# Patient Record
Sex: FEMALE | Race: BLACK OR AFRICAN AMERICAN | NOT HISPANIC OR LATINO | Employment: UNEMPLOYED
[De-identification: names, ages, dates, MRNs, and addresses within clinical notes are randomized per-mention and may not be internally consistent; named-entity substitution may affect disease eponyms.]

---

## 2020-01-10 ENCOUNTER — HOSPITAL (OUTPATIENT)
Dept: OTHER | Age: 40
End: 2020-01-10
Attending: FAMILY MEDICINE

## 2020-03-06 ENCOUNTER — HOSPITAL (OUTPATIENT)
Dept: OTHER | Age: 40
End: 2020-03-06
Attending: FAMILY MEDICINE

## 2020-12-10 ENCOUNTER — HOSPITAL ENCOUNTER (EMERGENCY)
Age: 40
Discharge: HOME | End: 2020-12-11
Attending: EMERGENCY MEDICINE

## 2020-12-10 DIAGNOSIS — D64.9 ANEMIA, UNSPECIFIED TYPE: Primary | ICD-10-CM

## 2020-12-10 LAB
ABO + RH BLD: NORMAL
BASOPHILS # BLD: 0.1 K/MCL (ref 0–0.3)
BASOPHILS NFR BLD: 1 %
BLD GP AB SCN SERPL QL GEL: NEGATIVE
BLOOD EXPIRATION DATE: NORMAL
BLOOD EXPIRATION DATE: NORMAL
CROSSMATCH RESULT: NORMAL
CROSSMATCH RESULT: NORMAL
DEPRECATED RDW RBC: 45.2 FL (ref 39–50)
DISPENSE STATUS: NORMAL
DISPENSE STATUS: NORMAL
EOSINOPHIL # BLD: 0.1 K/MCL (ref 0–0.5)
EOSINOPHIL NFR BLD: 2 %
ERYTHROCYTE [DISTWIDTH] IN BLOOD: 19.2 % (ref 11–15)
HCG UR QL: NEGATIVE
HCT VFR BLD CALC: 22.2 % (ref 36–46.5)
HGB BLD-MCNC: 5.4 G/DL (ref 12–15.5)
IMM GRANULOCYTES # BLD AUTO: 0 K/MCL (ref 0–0.2)
IMM GRANULOCYTES # BLD: 1 %
IRON SATN MFR SERPL: 3 % (ref 15–45)
IRON SERPL-MCNC: 16 MCG/DL (ref 50–170)
ISBT BLOOD TYPE: 9500
ISBT BLOOD TYPE: 9500
ISSUE DATE/TIME: NORMAL
ISSUE DATE/TIME: NORMAL
LYMPHOCYTES # BLD: 1.9 K/MCL (ref 1–4.8)
LYMPHOCYTES NFR BLD: 25 %
MCH RBC QN AUTO: 16.3 PG (ref 26–34)
MCHC RBC AUTO-ENTMCNC: 24.3 G/DL (ref 32–36.5)
MCV RBC AUTO: 67.1 FL (ref 78–100)
MONOCYTES # BLD: 0.5 K/MCL (ref 0.3–0.9)
MONOCYTES NFR BLD: 7 %
NEUTROPHILS # BLD: 5 K/MCL (ref 1.8–7.7)
NEUTROPHILS NFR BLD: 64 %
NRBC BLD MANUAL-RTO: 0 /100 WBC
PLATELET # BLD AUTO: 382 K/MCL (ref 140–450)
PRODUCT CODE: NORMAL
PRODUCT CODE: NORMAL
PRODUCT DESCRIPTION: NORMAL
PRODUCT DESCRIPTION: NORMAL
PRODUCT ID: NORMAL
PRODUCT ID: NORMAL
RBC # BLD: 3.31 MIL/MCL (ref 4–5.2)
TIBC SERPL-MCNC: 459 MCG/DL (ref 250–450)
TYPE AND SCREEN EXPIRATION DATE: NORMAL
UNIT BLOOD TYPE: NORMAL
UNIT BLOOD TYPE: NORMAL
UNIT NUMBER: NORMAL
UNIT NUMBER: NORMAL
WBC # BLD: 7.7 K/MCL (ref 4.2–11)

## 2020-12-10 PROCEDURE — 83540 ASSAY OF IRON: CPT | Performed by: STUDENT IN AN ORGANIZED HEALTH CARE EDUCATION/TRAINING PROGRAM

## 2020-12-10 PROCEDURE — P9016 RBC LEUKOCYTES REDUCED: HCPCS

## 2020-12-10 PROCEDURE — 86901 BLOOD TYPING SEROLOGIC RH(D): CPT | Performed by: EMERGENCY MEDICINE

## 2020-12-10 PROCEDURE — 99283 EMERGENCY DEPT VISIT LOW MDM: CPT

## 2020-12-10 PROCEDURE — 99284 EMERGENCY DEPT VISIT MOD MDM: CPT | Performed by: EMERGENCY MEDICINE

## 2020-12-10 PROCEDURE — 84703 CHORIONIC GONADOTROPIN ASSAY: CPT

## 2020-12-10 PROCEDURE — 96366 THER/PROPH/DIAG IV INF ADDON: CPT

## 2020-12-10 PROCEDURE — 10002800 HB RX 250 W HCPCS: Performed by: STUDENT IN AN ORGANIZED HEALTH CARE EDUCATION/TRAINING PROGRAM

## 2020-12-10 PROCEDURE — 85025 COMPLETE CBC W/AUTO DIFF WBC: CPT | Performed by: EMERGENCY MEDICINE

## 2020-12-10 PROCEDURE — 96365 THER/PROPH/DIAG IV INF INIT: CPT

## 2020-12-10 PROCEDURE — 10002807 HB RX 258: Performed by: STUDENT IN AN ORGANIZED HEALTH CARE EDUCATION/TRAINING PROGRAM

## 2020-12-10 PROCEDURE — 36430 TRANSFUSION BLD/BLD COMPNT: CPT

## 2020-12-10 RX ORDER — SODIUM CHLORIDE 9 MG/ML
INJECTION, SOLUTION INTRAVENOUS CONTINUOUS PRN
Status: DISCONTINUED | OUTPATIENT
Start: 2020-12-10 | End: 2020-12-11 | Stop reason: HOSPADM

## 2020-12-10 RX ADMIN — IRON SUCROSE 300 MG: 20 INJECTION, SOLUTION INTRAVENOUS at 23:41

## 2020-12-10 ASSESSMENT — ENCOUNTER SYMPTOMS
DIAPHORESIS: 0
CHILLS: 0
WHEEZING: 0
COUGH: 0
SHORTNESS OF BREATH: 1
ACTIVITY CHANGE: 0
WEAKNESS: 0
UNEXPECTED WEIGHT CHANGE: 0
NUMBNESS: 0
HEADACHES: 0
CHOKING: 0
FATIGUE: 1
DIZZINESS: 1
APPETITE CHANGE: 0
EYES NEGATIVE: 1
CHEST TIGHTNESS: 0
LIGHT-HEADEDNESS: 1
FEVER: 0
GASTROINTESTINAL NEGATIVE: 1
STRIDOR: 0

## 2020-12-10 ASSESSMENT — PAIN SCALES - GENERAL: PAINLEVEL_OUTOF10: 0

## 2020-12-11 VITALS
RESPIRATION RATE: 18 BRPM | HEART RATE: 71 BPM | DIASTOLIC BLOOD PRESSURE: 85 MMHG | TEMPERATURE: 98.4 F | SYSTOLIC BLOOD PRESSURE: 134 MMHG | OXYGEN SATURATION: 98 %

## 2020-12-11 LAB
RAINBOW EXTRA TUBES HOLD SPECIMEN: NORMAL

## 2020-12-11 PROCEDURE — 86923 COMPATIBILITY TEST ELECTRIC: CPT

## 2021-11-30 ENCOUNTER — OFF PREMISE (OUTPATIENT)
Dept: OTHER | Age: 41
End: 2021-11-30

## 2021-11-30 PROCEDURE — 93010 ELECTROCARDIOGRAM REPORT: CPT | Performed by: INTERNAL MEDICINE

## 2022-06-13 ENCOUNTER — TELEPHONE (OUTPATIENT)
Dept: OBGYN CLINIC | Facility: CLINIC | Age: 42
End: 2022-06-13

## 2022-06-13 ENCOUNTER — OFFICE VISIT (OUTPATIENT)
Dept: OBGYN CLINIC | Facility: CLINIC | Age: 42
End: 2022-06-13
Payer: COMMERCIAL

## 2022-06-13 ENCOUNTER — LAB ENCOUNTER (OUTPATIENT)
Dept: LAB | Facility: HOSPITAL | Age: 42
End: 2022-06-13
Attending: OBSTETRICS & GYNECOLOGY
Payer: COMMERCIAL

## 2022-06-13 VITALS — WEIGHT: 222 LBS | SYSTOLIC BLOOD PRESSURE: 112 MMHG | DIASTOLIC BLOOD PRESSURE: 82 MMHG

## 2022-06-13 DIAGNOSIS — D25.1 INTRAMURAL AND SUBMUCOUS LEIOMYOMA OF UTERUS: ICD-10-CM

## 2022-06-13 DIAGNOSIS — D64.9 ANEMIA, UNSPECIFIED TYPE: Primary | ICD-10-CM

## 2022-06-13 DIAGNOSIS — N92.0 MENORRHAGIA WITH REGULAR CYCLE: ICD-10-CM

## 2022-06-13 DIAGNOSIS — D25.0 INTRAMURAL AND SUBMUCOUS LEIOMYOMA OF UTERUS: ICD-10-CM

## 2022-06-13 LAB
BASOPHILS # BLD AUTO: 0.06 X10(3) UL (ref 0–0.2)
BASOPHILS NFR BLD AUTO: 0.8 %
DEPRECATED HBV CORE AB SER IA-ACNC: 17.7 NG/ML
DEPRECATED RDW RBC AUTO: 45.3 FL (ref 35.1–46.3)
EOSINOPHIL # BLD AUTO: 0.11 X10(3) UL (ref 0–0.7)
EOSINOPHIL NFR BLD AUTO: 1.5 %
ERYTHROCYTE [DISTWIDTH] IN BLOOD BY AUTOMATED COUNT: 14.6 % (ref 11–15)
HCT VFR BLD AUTO: 37.4 %
HGB BLD-MCNC: 11 G/DL
IMM GRANULOCYTES # BLD AUTO: 0.01 X10(3) UL (ref 0–1)
IMM GRANULOCYTES NFR BLD: 0.1 %
LYMPHOCYTES # BLD AUTO: 1.86 X10(3) UL (ref 1–4)
LYMPHOCYTES NFR BLD AUTO: 26.1 %
MCH RBC QN AUTO: 25.1 PG (ref 26–34)
MCHC RBC AUTO-ENTMCNC: 29.4 G/DL (ref 31–37)
MCV RBC AUTO: 85.2 FL
MONOCYTES # BLD AUTO: 0.46 X10(3) UL (ref 0.1–1)
MONOCYTES NFR BLD AUTO: 6.5 %
NEUTROPHILS # BLD AUTO: 4.63 X10 (3) UL (ref 1.5–7.7)
NEUTROPHILS # BLD AUTO: 4.63 X10(3) UL (ref 1.5–7.7)
NEUTROPHILS NFR BLD AUTO: 65 %
PLATELET # BLD AUTO: 358 10(3)UL (ref 150–450)
RBC # BLD AUTO: 4.39 X10(6)UL
WBC # BLD AUTO: 7.1 X10(3) UL (ref 4–11)

## 2022-06-13 PROCEDURE — 82728 ASSAY OF FERRITIN: CPT | Performed by: OBSTETRICS & GYNECOLOGY

## 2022-06-13 PROCEDURE — 36415 COLL VENOUS BLD VENIPUNCTURE: CPT | Performed by: OBSTETRICS & GYNECOLOGY

## 2022-06-13 PROCEDURE — 85025 COMPLETE CBC W/AUTO DIFF WBC: CPT | Performed by: OBSTETRICS & GYNECOLOGY

## 2022-06-13 RX ORDER — IBUPROFEN 800 MG/1
800 TABLET ORAL EVERY 6 HOURS PRN
Qty: 30 TABLET | Refills: 1 | Status: SHIPPED | OUTPATIENT
Start: 2022-06-13

## 2022-06-13 RX ORDER — TRANEXAMIC ACID 650 1/1
1300 TABLET ORAL 2 TIMES DAILY
Qty: 30 TABLET | Refills: 4 | Status: SHIPPED | OUTPATIENT
Start: 2022-06-13 | End: 2022-06-18

## 2022-06-13 NOTE — PROGRESS NOTES
Patient presents with: Follow - Up: anemia and menorrhagia        Visit Type:  F/u menorrhagia returns    Date of Procedure:  January 2022    Procedure:  Hyst. D&C myosure and novasure     Indications for Procedure: Menorrhagia with uterine fibroids     Pathology Report:  Benign     Surgery/Post-op Complications: None    Pain:  none    Medications pertaining to Surgery: None    Incision (if applicable):  N/A    Diet: No    Voiding:  No    Bowel movements/Flatus: Yes    Activity: Normal    /82   Wt 222 lb (100.7 kg)   LMP 06/03/2022 (Exact Date)   Wt Readings from Last 6 Encounters:  06/13/22 : 222 lb (100.7 kg)    Annual Physical Never done  Annual Depression Screen Never done  Pap Smear Never done  Mammogram Never done  COVID-19 Vaccine(2 - Booster for Godfrey series) due on 12/27/2021  Influenza Vaccine(Season Ended) due on 10/01/2022  Pneumococcal Vaccine: Birth to 64yrs Aged Out      Review of Systems   General: Present- Feeling well. Cardiovascular: Not Present- Chest Pain, Elevated Blood Pressure, Leg Pain and/or Swelling and Shortness of Breath. Gastrointestinal: Not Present- Nausea and Vomiting. Female Genitourinary: Not Present- Discharge, Dysmenorrhea, Dysuria, Excessive Menstrual Bleeding and Pelvic Pain. Musculoskeletal: Not Present- Leg Cramps and Swelling of Extremities. Neurological: Not Present- Headaches. Psychiatric: Not Present- Anxiety and Depression. All other systems negative       Physical Exam   The physical exam findings are as follows:       General   Mental Status - Alert. General Appearance - Well Developed/Well Nourished/No acute distress/ NC/AT. Note: More than 50% of this visit was spent in counseling or coordinating care for the following reason menorrhagia and anemia  . The total amount of time spent was 20 minutes. Plan to check CBC and ferritin levels today. We will send in a prescription for Lysteda and Ferralet. 1. Anemia, unspecified type    2. Intramural and submucous leiomyoma of uterus    3.  Menorrhagia with regular cycle

## 2022-06-15 ENCOUNTER — PATIENT MESSAGE (OUTPATIENT)
Dept: OBGYN CLINIC | Facility: CLINIC | Age: 42
End: 2022-06-15

## 2022-06-16 ENCOUNTER — TELEPHONE (OUTPATIENT)
Dept: OBGYN CLINIC | Facility: CLINIC | Age: 42
End: 2022-06-16

## 2022-06-16 NOTE — TELEPHONE ENCOUNTER
----- Message from Henrique Almaguer MD sent at 6/16/2022 11:35 AM CDT -----  Could benefit from iron infusion.  Please schedule     Henrique Almaguer MD

## 2022-06-16 NOTE — TELEPHONE ENCOUNTER
----- Message from Sydni Raya MD sent at 6/16/2022 11:35 AM CDT -----  Could benefit from iron infusion.  Please schedule     Sydni Raya MD

## 2022-06-16 NOTE — TELEPHONE ENCOUNTER
----- Message from Melo Garcia MD sent at 6/16/2022 11:35 AM CDT -----  Could benefit from iron infusion.  Please schedule     Melo Garcia MD

## 2022-06-17 PROBLEM — D64.9 ANEMIA, UNSPECIFIED: Status: ACTIVE | Noted: 2022-06-17

## 2022-06-20 NOTE — TELEPHONE ENCOUNTER
Patient identity confirmed by name and date of birth. Notified of results and recommendations as noted by provider. Chart review shows that referral for iron infusion has already been sent to infusion center on 6/17/22. Patient advised to return call to office if not contacted in 1-2 days. Verbalizes understanding and agrees with plan. Cathy Green MD   6/16/2022 11:35 AM CDT         Could benefit from iron infusion.  Please schedule   Duane Roper MD

## 2022-06-20 NOTE — TELEPHONE ENCOUNTER
See alternate encounter ([employer] message of 6/15/22) for additional documentation. Patient notified, infusion center referral sent.

## 2022-06-22 ENCOUNTER — TELEPHONE (OUTPATIENT)
Dept: HEMATOLOGY/ONCOLOGY | Facility: HOSPITAL | Age: 42
End: 2022-06-22

## 2022-06-22 NOTE — TELEPHONE ENCOUNTER
Attempted to reach Crystal to schedule Venofer 300 times 3.  No answer mailbox is full unable to leave a message

## 2022-07-19 ENCOUNTER — OFFICE VISIT (OUTPATIENT)
Dept: HEMATOLOGY/ONCOLOGY | Facility: HOSPITAL | Age: 42
End: 2022-07-19
Attending: OBSTETRICS & GYNECOLOGY
Payer: COMMERCIAL

## 2022-07-19 VITALS
TEMPERATURE: 99 F | HEART RATE: 83 BPM | SYSTOLIC BLOOD PRESSURE: 112 MMHG | RESPIRATION RATE: 16 BRPM | DIASTOLIC BLOOD PRESSURE: 72 MMHG | OXYGEN SATURATION: 100 %

## 2022-07-19 DIAGNOSIS — D64.9 ANEMIA, UNSPECIFIED: Primary | ICD-10-CM

## 2022-07-19 PROCEDURE — 96365 THER/PROPH/DIAG IV INF INIT: CPT

## 2022-07-19 PROCEDURE — 96366 THER/PROPH/DIAG IV INF ADDON: CPT

## 2022-07-19 NOTE — PROGRESS NOTES
Pt arrived to infusion for Venofer 300 1 of 3 for anemia. Denies new complaints. Ambulatory with fiance. PIV started in L hand with good blood return noted. Pt states she has had iron in the past and it has worked well. Educated pt on infusion and treatment plan of care-states understanding. Venofer 300mg given over 1.5 hrs with 30 mins observation time. VSS and denies complaints. Tolerated infusion well. PIV dc'd and wrapped in coban. AVS provided.

## 2022-07-25 ENCOUNTER — OFFICE VISIT (OUTPATIENT)
Dept: HEMATOLOGY/ONCOLOGY | Facility: HOSPITAL | Age: 42
End: 2022-07-25
Attending: OBSTETRICS & GYNECOLOGY
Payer: COMMERCIAL

## 2022-07-25 VITALS
OXYGEN SATURATION: 96 % | DIASTOLIC BLOOD PRESSURE: 68 MMHG | RESPIRATION RATE: 16 BRPM | TEMPERATURE: 98 F | SYSTOLIC BLOOD PRESSURE: 119 MMHG | HEART RATE: 80 BPM

## 2022-07-25 DIAGNOSIS — D64.9 ANEMIA: ICD-10-CM

## 2022-07-25 DIAGNOSIS — D64.9 ANEMIA, UNSPECIFIED: Primary | ICD-10-CM

## 2022-07-25 PROCEDURE — 96366 THER/PROPH/DIAG IV INF ADDON: CPT

## 2022-07-25 PROCEDURE — 96365 THER/PROPH/DIAG IV INF INIT: CPT

## 2022-07-25 NOTE — PROGRESS NOTES
Pt arrived to infusion for Venofer 300 2 of 3 for anemia. Arrives ambulating independently accompanied by family member. PIV established to right hand on 2nd attempt - present blood return noted. Venofer 300mg given over 1.5 hrs with 30 mins observation time. VSS and denies complaints. Tolerated infusion well. PIV removed, site covered with gauze and coban. Discharged to home from infusion ambulating independently.

## 2022-08-01 ENCOUNTER — OFFICE VISIT (OUTPATIENT)
Dept: HEMATOLOGY/ONCOLOGY | Facility: HOSPITAL | Age: 42
End: 2022-08-01
Attending: OBSTETRICS & GYNECOLOGY
Payer: COMMERCIAL

## 2022-08-01 VITALS
DIASTOLIC BLOOD PRESSURE: 73 MMHG | SYSTOLIC BLOOD PRESSURE: 119 MMHG | OXYGEN SATURATION: 98 % | TEMPERATURE: 98 F | RESPIRATION RATE: 16 BRPM | HEART RATE: 78 BPM

## 2022-08-01 DIAGNOSIS — D64.9 ANEMIA, UNSPECIFIED: Primary | ICD-10-CM

## 2022-08-01 PROCEDURE — 96365 THER/PROPH/DIAG IV INF INIT: CPT

## 2022-08-01 PROCEDURE — 96366 THER/PROPH/DIAG IV INF ADDON: CPT

## 2022-08-01 NOTE — PROGRESS NOTES
Pt arrived to infusion for Venofer 300 3 of 3 for anemia. Arrives ambulating independently accompanied by family member. States she is feeling well, no complaints at this time. PIV established to [facility] - present blood return noted. Venofer 300mg given over 1.5 hrs with 30 mins observation time. PIV removed, site covered with gauze and coban. Discharged to home from infusion ambulating independently.

## 2025-03-04 ENCOUNTER — OFFICE VISIT (OUTPATIENT)
Dept: OBGYN CLINIC | Facility: CLINIC | Age: 45
End: 2025-03-04

## 2025-03-04 ENCOUNTER — TELEPHONE (OUTPATIENT)
Dept: OBGYN CLINIC | Facility: CLINIC | Age: 45
End: 2025-03-04

## 2025-03-04 VITALS
WEIGHT: 210.19 LBS | DIASTOLIC BLOOD PRESSURE: 84 MMHG | HEIGHT: 65 IN | BODY MASS INDEX: 35.02 KG/M2 | SYSTOLIC BLOOD PRESSURE: 126 MMHG

## 2025-03-04 DIAGNOSIS — D25.1 INTRAMURAL UTERINE FIBROID: Primary | ICD-10-CM

## 2025-03-04 DIAGNOSIS — D50.0 IRON DEFICIENCY ANEMIA DUE TO CHRONIC BLOOD LOSS: ICD-10-CM

## 2025-03-04 DIAGNOSIS — N92.1 MENORRHAGIA WITH IRREGULAR CYCLE: ICD-10-CM

## 2025-03-04 DIAGNOSIS — R10.2 PELVIC PAIN: ICD-10-CM

## 2025-03-04 PROCEDURE — 99213 OFFICE O/P EST LOW 20 MIN: CPT | Performed by: OBSTETRICS & GYNECOLOGY

## 2025-03-04 NOTE — PROGRESS NOTES
[facility]  Obstetrics and Gynecology  Gynecology Visit    Chief Complaint   Patient presents with    Consult           Pati Harrison is a 44 year old female who presents for consult.    LMP: .    Menses regular: irregular 2x per month.    Menstrual flow normal: heavy flow.    Birth control or HRT:  0.   Refill 0  Last Pap Smear: 2023.  Any history of abnormal paps: No hx abn   Last MMG: order entered  Any Medication Refills needed today?: no  Sleep: 7-8 hours.    Diet: balanced.    Exercise: occasional.   Screening labs/Blood work today: no.     Colonoscopy (if over 44 y/o): n/a.   Gardasil:(age 9-44 y/o) n/a.   Genetic Cancer screen (if indicated): no.   Flu (Aug-April): patient declined.TDAP (every 10 years) up to date.      Additional Problems/concerns: patient would like to discuss hysterectomy.       Next Appt: n/a    Immunization History   Administered Date(s) Administered    Covid-19 Vaccine Optoro (J&J) 0.5ml 2021    TDAP 2020         Current Outpatient Medications:     ibuprofen 800 MG Oral Tab, Take 1 tablet (800 mg total) by mouth every 6 (six) hours as needed for Pain., Disp: 30 tablet, Rfl: 1    Allergies[1]    OB History    Para Term  AB Living   4 2 2 0 2 2   SAB IAB Ectopic Multiple Live Births   0 2 0 0 2      # Outcome Date GA Lbr Valeriy/2nd Weight Sex Type Anes PTL Lv   4 Term  40w0d  6 lb (2.722 kg) M Vag-Spont  N VALENCIA   3 Term  40w0d  6 lb 3 oz (2.807 kg) F CS-Unspec   VALENCIA   2 IAB            1 IAB                Gyn History       No data recorded       No data to display                    Past Medical History:    Anemia, unspecified       Past Surgical History:   Procedure Laterality Date    D & c         No family history on file.     Tobacco  Allergies  Soc Hx        Social History     Socioeconomic History    Marital status: Unknown     Spouse name: Not on file    Number of children: Not on file    Years of education: Not on file     Highest education level: Not on file   Occupational History    Not on file   Tobacco Use    Smoking status: Never     Passive exposure: Never    Smokeless tobacco: Never   Vaping Use    Vaping status: Never Used   Substance and Sexual Activity    Alcohol use: Never    Drug use: Never    Sexual activity: Not on file   Other Topics Concern    Not on file   Social History Narrative    Not on file     Social Drivers of Health     Food Insecurity: No Food Insecurity (12/13/2023)    Received from [facility]    Hunger Vital Sign     Worried About Running Out of Food in the Last Year: Never true     Ran Out of Food in the Last Year: Never true   Transportation Needs: Not on file   Stress: Not on file   Housing Stability: Not on file     /84   Ht 5' 5\" (1.651 m)   Wt 210 lb 3.3 oz (95.4 kg)   BMI 34.98 kg/m²     Wt Readings from Last 3 Encounters:   03/04/25 210 lb 3.3 oz (95.4 kg)   06/13/22 222 lb (100.7 kg)         Health Maintenance   Topic Date Due    Influenza Vaccine (1) 08/01/2021    Screen for Cervical Cancer 11/05/2021    DTaP,Tdap and Td Vaccines (3 - Td or Tdap) 03/18/2025    Hepatitis C Screening Completed    HIV Screening Completed    COVID-19 Vaccine Completed     Review of Systems   General: Present- Feeling well. Not Present- Chills, Fever, Weight Gain and Weight Loss.  HEENT: Not Present- Headache and Sore Throat.  Respiratory: Not Present- Cough, Difficulty Breathing, Hemoptysis and Sputum Production.  Cardiovascular: Not Present- Chest Pain, Elevated Blood Pressure, Fainting / Blacking Out and Shortness of Breath.  Gastrointestinal: Not Present- Constipation, Diarrhea, Nausea and Vomiting.  Female Genitourinary: Not Present- Discharge, Dysuria and Frequency.  Musculoskeletal: Not Present- Leg Cramps and Swelling of Extremities.  Neurological: Not Present- Dizziness and Headaches.  Psychiatric: Not Present- Anxiety and Depression.  Endocrine: Not Present- Appetite Changes, Hair  Changes and Thyroid Problems.  Hematology: Not Present- Easy Bruising and Excessive bleeding.  All other systems negative     Physical Exam The physical exam findings are as follows:     General   Mental Status - Alert. General Appearance - Cooperative. Orientation - Oriented X4. Build & Nutrition - Well nourished.    Head and Neck  Thyroid   Gland Characteristics - normal size and consistency.    Chest and Lung Exam   Inspection:   Chest Wall: - Normal.  Percussion:   Quality and Intensity: - Percussion normal.  Palpation: - Palpation normal.  Auscultation:   Breath sounds: - Normal.  Adventitious sounds: - No Adventitious sounds.    Breast   Nipples: Characteristics - Bilateral - Normal. Discharge - Bilateral - None.  Breast - Bilateral - Symmetric.    Cardiovascular   Auscultation: Rhythm - Regular. Heart Sounds - Normal heart sounds.  Murmurs & Other Heart Sounds: Auscultation of the heart reveals - No Murmurs.    Abdomen   Inspection: Inspection of the abdomen reveals - No Hernias. Incisional scars - c/s incisional scars.  Palpation/Percussion: Palpation and Percussion of the abdomen reveal - Non Tender and No Palpable abdominal masses.  Liver: - Normal.  Auscultation: Auscultation of the abdomen reveals - Bowel sounds normal.    Enlarged fibroid uterus - tender to palpation       Peripheral Vascular   Upper Extremity:   Palpation: - Pulses bilaterally normal.  Lower Extremity: Inspection - Bilateral - Inspection Normal.  Palpation: Edema - Bilateral - No edema.    Neurologic   Mental Status: - Normal.    Lymphatic  General Lymphatics   Description - Normal .     FINDINGS: Lung bases are clear.  Hiatal hernia is small.     There is fatty deposition along the falciform ligament of the otherwise normal liver.  The spleen and pancreas are normal.  Gallbladder is normal.     The adrenal glands are normal.  There is scarring in the right kidney and a nonobstructing 8 mm calculus is noted in the lower pole.  The left  kidney is normal.  The ureters are normal in course and caliber.  The urinary bladder is normal.     The uterus is enlarged and heterogeneous, measuring 8.9 x 8.5 x 14.1 cm.  Multiple hypervascular nodules are seen.     The bowel is normal in caliber and wall thickness.  There is no free intraperitoneal air or fluid.  No pneumatosis or portal venous gas is seen.  The appendix is normal.     Aorta and iliac arteries are normal in caliber.  Lymph nodes are normal in size.  No acute osseous abnormality is seen.     IMPRESSION:     FIBROID UTERUS.     Desires KAVITHA/BSO Patient was provided with informed consent for surgery including a review of the proposed surgery and all possibilities.  A discussion of the risks of the procedure, benefits, side effects, and success were addressed.  Alternative treatments were discussed as well.  All questions were answered.  Patient is to proceed with surgery.      1. Intramural uterine fibroid    2. Pelvic pain    3. Menorrhagia with irregular cycle    4. Iron deficiency anemia due to chronic blood loss                            [1] No Known Allergies

## 2025-03-05 NOTE — TELEPHONE ENCOUNTER
SURGERY:  schedule KAVITHA/BSO    DATE REQUESTED: March 10, 24, 31 April 7(first choice), 11 or 21     Hosp Stay: Out patient in a bed     Major/Minor: Major      Anticipated surgical time: 1hr     Anesthesia: ERAS, Spinal Duramorph with general anesthesia     ASSIST NEEDED:  yes     PRE-OP WITH PCP: yes if >49y/o or major medical problems or BMI >40     DX:  fibroids and pelvic pain, menorrhagia, iron def anemia     Evy Heard MD

## 2025-03-14 NOTE — TELEPHONE ENCOUNTER
I spoke with the patient, confirmed date and time for her procedure. I also sent a major surgical case letter via [employer]     Surgical case request has been sent    I sent a message to Dr. Neumann in regards to the assist     I will process prior authorization closer to date

## 2025-03-18 ENCOUNTER — PATIENT MESSAGE (OUTPATIENT)
Dept: OBGYN CLINIC | Facility: CLINIC | Age: 45
End: 2025-03-18

## 2025-04-02 NOTE — TELEPHONE ENCOUNTER
Prior authorization is not needed, reference number is the representatives name, time and today's date    Caroline GALAN 11:30AM EST 04/02/25

## 2025-04-16 ENCOUNTER — PATIENT MESSAGE (OUTPATIENT)
Dept: OBGYN CLINIC | Facility: CLINIC | Age: 45
End: 2025-04-16

## 2025-04-29 NOTE — H&P
[facility]  part of [facility]        HISTORY AND PHYSICAL        Subjective   Chief Complaint:  Intramural uterine fibroid, Pelvic Pain      History of Present Illness:    Pati Harrison is a  44 year old y/o  who presents for scheduled gyn procedure TOTAL ABDOMINAL HYSTERECTOMY WITH BILATERAL SALPINGOOPHERECTOMY .The patients complaints include Fibroid, pelvic pain, iron deficient anemia.          Past Medical History[1]    Past Surgical History[2]    OB History    Para Term  AB Living   4 2 2 0 2 2   SAB IAB Ectopic Multiple Live Births   0 2 0 0 2       Allergies[3]    Medications - Current[4]      Family History[5]      REVIEW OF SYSTEMS:   CONSTITUTIONAL: Negative for fever, chills, diaphoresis, weakness, fatigue, weight loss, weight gain.  ALLERGIES: Negative for urticaria, hay fever, angioedema  EYES: Negative for blurry vision, decreased vision, loss of vision, eye pain, diplopia, photophobia, discharge  ENT: Negative for sore throat, nasal congestion, nasal discharge, epistaxis, tinnitus, hearing loss  CARDIOVASCULAR: Negative for chest pain, dyspnea on exertion, orthopnea, paroxysmal nocturnal dyspnea, edema, palpitations  RESPIRATORY: Negative for cough, hemoptysis, shortness of breath, pleuritic chest pain, wheezing  BREAST:  Denies breast mass, breast pain, nipple discharge or nipple pain.  ENDOCRINE: Negative for polydipsia/polyuria, palpitations, skin changes, temperature intolerance, unexpected weight changes  HEME-LYMPH: Negative for swollen lymph nodes, bleeding, bruising  GI: Negative abdominal pain, flank pain, nausea, vomiting, diarrhea, constipation, black stool, blood in stool  : Negative for dysuria, frequency/urgency, hematuria, genital discharge  NEURO: Negative for dizzy/vertigo, headache, focal weakness, numbness/tingling, speech problems, loss of consciousness, confusion, memory loss  MUSCULOSKELETAL: Negative for back pain, joint pain,  joint stiffness, joint swelling, muscle pain, muscle weakness  SKIN: Negative for rash, itching, hives  PSYCH: Negative for anxiety, depression, physical abuse, sexual abuse      PHYSICAL EXAM:    There were no vitals taken for this visit.       General   Mental Status - Alert. General Appearance - Cooperative. Orientation - Oriented X4. Build & Nutrition - Well nourished.    Head and Neck  Thyroid   Gland Characteristics - normal size and consistency.    Chest and Lung Exam   Inspection:   Chest Wall: - Normal.  Percussion:   Quality and Intensity: - Percussion normal.  Palpation: - Palpation normal.  Auscultation:   Breath sounds: - Normal.  Adventitious sounds: - No Adventitious sounds.      Cardiovascular   Auscultation: Rhythm - Regular. Heart Sounds - Normal heart sounds.  Murmurs & Other Heart Sounds: Auscultation of the heart reveals - No Murmurs.      Abdomen   Inspection: Inspection of the abdomen reveals - No Hernias. Incisional scars - No incisional scars.  Palpation/Percussion: Palpation and Percussion of the abdomen reveal - Non Tender and No Palpable abdominal masses.  Liver: - Normal.  Auscultation: Auscultation of the abdomen reveals - Bowel sounds normal.      Female Genitourinary     External Genitalia   Perineum - Normal. Bartholin's Gland - Bilateral - Normal. Clitoris - Normal.  Introitus: Characteristics - No Cystocele, Enterocele or Rectocele. Discharge - None.  Labia Majora: Lesions - Bilateral - None. Characteristics - Bilateral - Normal.  Labia Minora: Lesions - Bilateral - None. Characteristics - Bilateral - Normal.  Urethra: Characteristics - Normal. Discharge - None.  Stallings Gland - Bilateral - Normal.  Vulva: Characteristics - Normal. Lesions - None.    Speculum & Bimanual   Vagina:   Vaginal Wall: - Normal.  Vaginal Lesions - None. Vaginal Mucosa - Normal.  Cervix: Characteristics - No Motion tenderness. Discharge - None.  Uterus: Characteristics - Normal. Position -  Midposition.  Adnexa: Characteristics - Bilateral - Normal. Masses - No Adnexal Masses.      Peripheral Vascular   Upper Extremity:   Palpation: - Pulses bilaterally normal.  Lower Extremity: Inspection - Bilateral - Inspection Normal.  Palpation: Edema - Bilateral - No edema.      Neurologic   Mental Status: - Normal.      Lymphatic  General Lymphatics   Description - Normal .      Lab Results   Component Value Date    WBC 7.1 06/13/2022    HGB 11.0 (L) 06/13/2022    HCT 37.4 06/13/2022    .0 06/13/2022    MCV 85.2 06/13/2022    RDW 14.6 06/13/2022     No components found for: \"ABOGROUP\", \"RHTYPE\", \"RUBIGG\"      FINDINGS: Lung bases are clear.  Hiatal hernia is small.     There is fatty deposition along the falciform ligament of the otherwise normal liver.  The spleen and pancreas are normal.  Gallbladder is normal.     The adrenal glands are normal.  There is scarring in the right kidney and a nonobstructing 8 mm calculus is noted in the lower pole.  The left kidney is normal.  The ureters are normal in course and caliber.  The urinary bladder is normal.     The uterus is enlarged and heterogeneous, measuring 8.9 x 8.5 x 14.1 cm.  Multiple hypervascular nodules are seen.     The bowel is normal in caliber and wall thickness.  There is no free intraperitoneal air or fluid.  No pneumatosis or portal venous gas is seen.  The appendix is normal.     Aorta and iliac arteries are normal in caliber.  Lymph nodes are normal in size.  No acute osseous abnormality is seen.     IMPRESSION:     FIBROID UTERUS.       Assessment and Plan:    Intramural uterine fibroid  Pelvic Pain  Iron deficiency anemia due to chronic blood loss  Menorrhagia with irregular cycle          The patient was counseled regarding surgery and the procedure (TOTAL ABDOMINAL HYSTERECTOMY WITH BILATERAL SALPINGOOPHERECTOMY ) was reviewed at length.   Risks of procedure including bleeding/need for blood transfusion (<1%), infection (5-10%), damage  to other organs/bowel/bladder/ureters (<1%),  and anesthesia were reviewed.  Benefits, alternatives, & indications were also discussed.  All questions were answered.  Written information was provided.      Maurizio DOHERTY        Patient seen and examined, Agree with assessment and plan of care documented by PA student.     Evy Heard MD         [1]   Past Medical History:   Anemia, unspecified   [2]   Past Surgical History:  Procedure Laterality Date    D & c      Hysteroscopy,with endometrial  01/24/2022   [3] No Known Allergies  [4]   Current Outpatient Medications:     ibuprofen 800 MG Oral Tab, Take 1 tablet (800 mg total) by mouth every 6 (six) hours as needed for Pain., Disp: 30 tablet, Rfl: 1  [5]   Family History  Problem Relation Age of Onset    Other (cervical cancer) Mother     Kidney Disease Maternal Grandmother     Glaucoma Paternal Grandfather     Breast Cancer Maternal Aunt 50

## 2025-05-01 RX ORDER — NAPROXEN 500 MG/1
500 TABLET ORAL EVERY 12 HOURS PRN
COMMUNITY
Start: 2025-01-14

## 2025-05-01 RX ORDER — ERGOCALCIFEROL 1.25 MG/1
CAPSULE, LIQUID FILLED ORAL
COMMUNITY
Start: 2025-03-02 | End: 2025-05-01

## 2025-05-01 RX ORDER — ERGOCALCIFEROL 1.25 MG/1
50000 CAPSULE ORAL
COMMUNITY
Start: 2025-03-02

## 2025-05-03 ENCOUNTER — LAB ENCOUNTER (OUTPATIENT)
Dept: LAB | Facility: HOSPITAL | Age: 45
End: 2025-05-03
Attending: OBSTETRICS & GYNECOLOGY
Payer: COMMERCIAL

## 2025-05-03 DIAGNOSIS — Z01.818 PREOP TESTING: ICD-10-CM

## 2025-05-03 LAB
ANTIBODY SCREEN: NEGATIVE
RH BLOOD TYPE: NEGATIVE

## 2025-05-03 PROCEDURE — 86900 BLOOD TYPING SEROLOGIC ABO: CPT

## 2025-05-03 PROCEDURE — 36415 COLL VENOUS BLD VENIPUNCTURE: CPT

## 2025-05-03 PROCEDURE — 86850 RBC ANTIBODY SCREEN: CPT

## 2025-05-03 PROCEDURE — 86901 BLOOD TYPING SEROLOGIC RH(D): CPT

## 2025-05-05 ENCOUNTER — HOSPITAL ENCOUNTER (OUTPATIENT)
Facility: HOSPITAL | Age: 45
Discharge: HOME | End: 2025-05-06
Attending: OBSTETRICS & GYNECOLOGY | Admitting: OBSTETRICS & GYNECOLOGY
Payer: COMMERCIAL

## 2025-05-05 ENCOUNTER — ANESTHESIA EVENT (OUTPATIENT)
Dept: SURGERY | Facility: HOSPITAL | Age: 45
End: 2025-05-05
Payer: COMMERCIAL

## 2025-05-05 ENCOUNTER — ANESTHESIA (OUTPATIENT)
Dept: SURGERY | Facility: HOSPITAL | Age: 45
End: 2025-05-05
Payer: COMMERCIAL

## 2025-05-05 DIAGNOSIS — Z90.710 S/P HYSTERECTOMY: ICD-10-CM

## 2025-05-05 DIAGNOSIS — D25.1 INTRAMURAL UTERINE FIBROID: ICD-10-CM

## 2025-05-05 DIAGNOSIS — D50.0 IRON DEFICIENCY ANEMIA DUE TO CHRONIC BLOOD LOSS: ICD-10-CM

## 2025-05-05 DIAGNOSIS — Z01.818 PREOP TESTING: Primary | ICD-10-CM

## 2025-05-05 DIAGNOSIS — R10.2 PELVIC PAIN: ICD-10-CM

## 2025-05-05 PROBLEM — N92.1 MENORRHAGIA WITH IRREGULAR CYCLE: Status: ACTIVE | Noted: 2025-05-05

## 2025-05-05 LAB
B-HCG UR QL: NEGATIVE
RH BLOOD TYPE: NEGATIVE

## 2025-05-05 PROCEDURE — 58150 TOTAL HYSTERECTOMY: CPT | Performed by: OBSTETRICS & GYNECOLOGY

## 2025-05-05 RX ORDER — NALOXONE HYDROCHLORIDE 0.4 MG/ML
80 INJECTION, SOLUTION INTRAMUSCULAR; INTRAVENOUS; SUBCUTANEOUS AS NEEDED
Status: DISCONTINUED | OUTPATIENT
Start: 2025-05-05 | End: 2025-05-05 | Stop reason: HOSPADM

## 2025-05-05 RX ORDER — CELECOXIB 200 MG/1
400 CAPSULE ORAL ONCE
Status: COMPLETED | OUTPATIENT
Start: 2025-05-05 | End: 2025-05-05

## 2025-05-05 RX ORDER — MIDAZOLAM HYDROCHLORIDE 1 MG/ML
INJECTION INTRAMUSCULAR; INTRAVENOUS AS NEEDED
Status: DISCONTINUED | OUTPATIENT
Start: 2025-05-05 | End: 2025-05-05 | Stop reason: SURG

## 2025-05-05 RX ORDER — HYDROMORPHONE HYDROCHLORIDE 1 MG/ML
0.2 INJECTION, SOLUTION INTRAMUSCULAR; INTRAVENOUS; SUBCUTANEOUS EVERY 5 MIN PRN
Status: DISCONTINUED | OUTPATIENT
Start: 2025-05-05 | End: 2025-05-05 | Stop reason: HOSPADM

## 2025-05-05 RX ORDER — ACETAMINOPHEN 500 MG
1000 TABLET ORAL ONCE
Status: COMPLETED | OUTPATIENT
Start: 2025-05-05 | End: 2025-05-05

## 2025-05-05 RX ORDER — GLYCOPYRROLATE 0.2 MG/ML
INJECTION, SOLUTION INTRAMUSCULAR; INTRAVENOUS AS NEEDED
Status: DISCONTINUED | OUTPATIENT
Start: 2025-05-05 | End: 2025-05-05 | Stop reason: SURG

## 2025-05-05 RX ORDER — MORPHINE SULFATE 1 MG/ML
INJECTION, SOLUTION EPIDURAL; INTRATHECAL; INTRAVENOUS
Status: COMPLETED | OUTPATIENT
Start: 2025-05-05 | End: 2025-05-05

## 2025-05-05 RX ORDER — LIDOCAINE HYDROCHLORIDE 10 MG/ML
INJECTION, SOLUTION EPIDURAL; INFILTRATION; INTRACAUDAL; PERINEURAL AS NEEDED
Status: DISCONTINUED | OUTPATIENT
Start: 2025-05-05 | End: 2025-05-05 | Stop reason: SURG

## 2025-05-05 RX ORDER — METOCLOPRAMIDE HYDROCHLORIDE 5 MG/ML
10 INJECTION INTRAMUSCULAR; INTRAVENOUS ONCE
Status: COMPLETED | OUTPATIENT
Start: 2025-05-05 | End: 2025-05-05

## 2025-05-05 RX ORDER — OXYCODONE HYDROCHLORIDE 5 MG/1
10 TABLET ORAL EVERY 4 HOURS PRN
Status: DISCONTINUED | OUTPATIENT
Start: 2025-05-05 | End: 2025-05-06

## 2025-05-05 RX ORDER — MORPHINE SULFATE 4 MG/ML
4 INJECTION, SOLUTION INTRAMUSCULAR; INTRAVENOUS EVERY 10 MIN PRN
Status: DISCONTINUED | OUTPATIENT
Start: 2025-05-05 | End: 2025-05-05 | Stop reason: HOSPADM

## 2025-05-05 RX ORDER — FAMOTIDINE 10 MG/ML
20 INJECTION, SOLUTION INTRAVENOUS ONCE
Status: COMPLETED | OUTPATIENT
Start: 2025-05-05 | End: 2025-05-05

## 2025-05-05 RX ORDER — GABAPENTIN 300 MG/1
300 CAPSULE ORAL 3 TIMES DAILY
Status: DISCONTINUED | OUTPATIENT
Start: 2025-05-05 | End: 2025-05-06

## 2025-05-05 RX ORDER — ROCURONIUM BROMIDE 10 MG/ML
INJECTION, SOLUTION INTRAVENOUS AS NEEDED
Status: DISCONTINUED | OUTPATIENT
Start: 2025-05-05 | End: 2025-05-05 | Stop reason: SURG

## 2025-05-05 RX ORDER — OXYCODONE HYDROCHLORIDE 5 MG/1
5 TABLET ORAL EVERY 4 HOURS PRN
Status: DISCONTINUED | OUTPATIENT
Start: 2025-05-05 | End: 2025-05-06

## 2025-05-05 RX ORDER — SODIUM CHLORIDE, SODIUM LACTATE, POTASSIUM CHLORIDE, CALCIUM CHLORIDE 600; 310; 30; 20 MG/100ML; MG/100ML; MG/100ML; MG/100ML
INJECTION, SOLUTION INTRAVENOUS CONTINUOUS
Status: DISCONTINUED | OUTPATIENT
Start: 2025-05-05 | End: 2025-05-05 | Stop reason: HOSPADM

## 2025-05-05 RX ORDER — KETOROLAC TROMETHAMINE 30 MG/ML
30 INJECTION, SOLUTION INTRAMUSCULAR; INTRAVENOUS EVERY 6 HOURS
Status: DISCONTINUED | OUTPATIENT
Start: 2025-05-05 | End: 2025-05-06

## 2025-05-05 RX ORDER — HEPARIN SODIUM 5000 [USP'U]/ML
5000 INJECTION, SOLUTION INTRAVENOUS; SUBCUTANEOUS ONCE
Status: COMPLETED | OUTPATIENT
Start: 2025-05-05 | End: 2025-05-05

## 2025-05-05 RX ORDER — HYDROMORPHONE HYDROCHLORIDE 1 MG/ML
0.6 INJECTION, SOLUTION INTRAMUSCULAR; INTRAVENOUS; SUBCUTANEOUS EVERY 5 MIN PRN
Status: DISCONTINUED | OUTPATIENT
Start: 2025-05-05 | End: 2025-05-05 | Stop reason: HOSPADM

## 2025-05-05 RX ORDER — FAMOTIDINE 20 MG/1
20 TABLET, FILM COATED ORAL ONCE
Status: COMPLETED | OUTPATIENT
Start: 2025-05-05 | End: 2025-05-05

## 2025-05-05 RX ORDER — HYDROMORPHONE HYDROCHLORIDE 1 MG/ML
0.4 INJECTION, SOLUTION INTRAMUSCULAR; INTRAVENOUS; SUBCUTANEOUS EVERY 5 MIN PRN
Status: DISCONTINUED | OUTPATIENT
Start: 2025-05-05 | End: 2025-05-05 | Stop reason: HOSPADM

## 2025-05-05 RX ORDER — SODIUM CHLORIDE, SODIUM LACTATE, POTASSIUM CHLORIDE, CALCIUM CHLORIDE 600; 310; 30; 20 MG/100ML; MG/100ML; MG/100ML; MG/100ML
INJECTION, SOLUTION INTRAVENOUS CONTINUOUS
Status: DISCONTINUED | OUTPATIENT
Start: 2025-05-05 | End: 2025-05-05

## 2025-05-05 RX ORDER — MORPHINE SULFATE 10 MG/ML
6 INJECTION, SOLUTION INTRAMUSCULAR; INTRAVENOUS EVERY 10 MIN PRN
Status: DISCONTINUED | OUTPATIENT
Start: 2025-05-05 | End: 2025-05-05 | Stop reason: HOSPADM

## 2025-05-05 RX ORDER — KETOROLAC TROMETHAMINE 30 MG/ML
INJECTION, SOLUTION INTRAMUSCULAR; INTRAVENOUS AS NEEDED
Status: DISCONTINUED | OUTPATIENT
Start: 2025-05-05 | End: 2025-05-05 | Stop reason: SURG

## 2025-05-05 RX ORDER — ONDANSETRON 4 MG/1
4 TABLET, FILM COATED ORAL EVERY 8 HOURS PRN
Status: DISCONTINUED | OUTPATIENT
Start: 2025-05-05 | End: 2025-05-06

## 2025-05-05 RX ORDER — LIDOCAINE HYDROCHLORIDE 10 MG/ML
INJECTION, SOLUTION EPIDURAL; INFILTRATION; INTRACAUDAL; PERINEURAL AS NEEDED
Status: DISCONTINUED | OUTPATIENT
Start: 2025-05-05 | End: 2025-05-05 | Stop reason: HOSPADM

## 2025-05-05 RX ORDER — BUPIVACAINE HYDROCHLORIDE 7.5 MG/ML
INJECTION, SOLUTION INTRASPINAL
Status: COMPLETED | OUTPATIENT
Start: 2025-05-05 | End: 2025-05-05

## 2025-05-05 RX ORDER — PHENYLEPHRINE HCL 10 MG/ML
VIAL (ML) INJECTION AS NEEDED
Status: DISCONTINUED | OUTPATIENT
Start: 2025-05-05 | End: 2025-05-05 | Stop reason: SURG

## 2025-05-05 RX ORDER — PROCHLORPERAZINE EDISYLATE 5 MG/ML
5 INJECTION INTRAMUSCULAR; INTRAVENOUS EVERY 8 HOURS PRN
Status: DISCONTINUED | OUTPATIENT
Start: 2025-05-05 | End: 2025-05-05 | Stop reason: HOSPADM

## 2025-05-05 RX ORDER — LIDOCAINE HYDROCHLORIDE 10 MG/ML
INJECTION, SOLUTION INFILTRATION; PERINEURAL
Status: COMPLETED | OUTPATIENT
Start: 2025-05-05 | End: 2025-05-05

## 2025-05-05 RX ORDER — SODIUM CHLORIDE, SODIUM LACTATE, POTASSIUM CHLORIDE, CALCIUM CHLORIDE 600; 310; 30; 20 MG/100ML; MG/100ML; MG/100ML; MG/100ML
INJECTION, SOLUTION INTRAVENOUS CONTINUOUS
Status: DISCONTINUED | OUTPATIENT
Start: 2025-05-05 | End: 2025-05-06

## 2025-05-05 RX ORDER — ONDANSETRON 2 MG/ML
INJECTION INTRAMUSCULAR; INTRAVENOUS AS NEEDED
Status: DISCONTINUED | OUTPATIENT
Start: 2025-05-05 | End: 2025-05-05 | Stop reason: SURG

## 2025-05-05 RX ORDER — ONDANSETRON 2 MG/ML
4 INJECTION INTRAMUSCULAR; INTRAVENOUS EVERY 6 HOURS PRN
Status: DISCONTINUED | OUTPATIENT
Start: 2025-05-05 | End: 2025-05-05 | Stop reason: HOSPADM

## 2025-05-05 RX ORDER — DEXAMETHASONE SODIUM PHOSPHATE 4 MG/ML
VIAL (ML) INJECTION AS NEEDED
Status: DISCONTINUED | OUTPATIENT
Start: 2025-05-05 | End: 2025-05-05 | Stop reason: SURG

## 2025-05-05 RX ORDER — ACETAMINOPHEN 500 MG
1000 TABLET ORAL EVERY 8 HOURS SCHEDULED
Status: DISCONTINUED | OUTPATIENT
Start: 2025-05-05 | End: 2025-05-06

## 2025-05-05 RX ORDER — MORPHINE SULFATE 4 MG/ML
2 INJECTION, SOLUTION INTRAMUSCULAR; INTRAVENOUS EVERY 10 MIN PRN
Status: DISCONTINUED | OUTPATIENT
Start: 2025-05-05 | End: 2025-05-05 | Stop reason: HOSPADM

## 2025-05-05 RX ORDER — ONDANSETRON 2 MG/ML
4 INJECTION INTRAMUSCULAR; INTRAVENOUS EVERY 8 HOURS PRN
Status: DISCONTINUED | OUTPATIENT
Start: 2025-05-05 | End: 2025-05-06

## 2025-05-05 RX ORDER — FAMOTIDINE 20 MG/1
20 TABLET, FILM COATED ORAL 2 TIMES DAILY
Status: DISCONTINUED | OUTPATIENT
Start: 2025-05-05 | End: 2025-05-06

## 2025-05-05 RX ORDER — METOCLOPRAMIDE 10 MG/1
10 TABLET ORAL ONCE
Status: COMPLETED | OUTPATIENT
Start: 2025-05-05 | End: 2025-05-05

## 2025-05-05 RX ADMIN — BUPIVACAINE HYDROCHLORIDE 1 ML: 7.5 INJECTION, SOLUTION INTRASPINAL at 07:40:00

## 2025-05-05 RX ADMIN — MIDAZOLAM HYDROCHLORIDE 2 MG: 1 INJECTION INTRAMUSCULAR; INTRAVENOUS at 07:37:00

## 2025-05-05 RX ADMIN — DEXAMETHASONE SODIUM PHOSPHATE 8 MG: 4 MG/ML VIAL (ML) INJECTION at 07:45:00

## 2025-05-05 RX ADMIN — PHENYLEPHRINE HCL 100 MCG: 10 MG/ML VIAL (ML) INJECTION at 08:30:00

## 2025-05-05 RX ADMIN — ROCURONIUM BROMIDE 70 MG: 10 INJECTION, SOLUTION INTRAVENOUS at 07:44:00

## 2025-05-05 RX ADMIN — SODIUM CHLORIDE, SODIUM LACTATE, POTASSIUM CHLORIDE, CALCIUM CHLORIDE: 600; 310; 30; 20 INJECTION, SOLUTION INTRAVENOUS at 08:12:00

## 2025-05-05 RX ADMIN — ONDANSETRON 4 MG: 2 INJECTION INTRAMUSCULAR; INTRAVENOUS at 08:52:00

## 2025-05-05 RX ADMIN — LIDOCAINE HYDROCHLORIDE 2 ML: 10 INJECTION, SOLUTION INFILTRATION; PERINEURAL at 07:39:00

## 2025-05-05 RX ADMIN — SODIUM CHLORIDE, SODIUM LACTATE, POTASSIUM CHLORIDE, CALCIUM CHLORIDE: 600; 310; 30; 20 INJECTION, SOLUTION INTRAVENOUS at 09:18:00

## 2025-05-05 RX ADMIN — ROCURONIUM BROMIDE 20 MG: 10 INJECTION, SOLUTION INTRAVENOUS at 08:11:00

## 2025-05-05 RX ADMIN — LIDOCAINE HYDROCHLORIDE 50 MG: 10 INJECTION, SOLUTION EPIDURAL; INFILTRATION; INTRACAUDAL; PERINEURAL at 07:43:00

## 2025-05-05 RX ADMIN — MORPHINE SULFATE 0.2 MG: 1 INJECTION, SOLUTION EPIDURAL; INTRATHECAL; INTRAVENOUS at 07:40:00

## 2025-05-05 RX ADMIN — KETOROLAC TROMETHAMINE 30 MG: 30 INJECTION, SOLUTION INTRAMUSCULAR; INTRAVENOUS at 09:02:00

## 2025-05-05 RX ADMIN — GLYCOPYRROLATE 0.2 MG: 0.2 INJECTION, SOLUTION INTRAMUSCULAR; INTRAVENOUS at 07:35:00

## 2025-05-05 NOTE — ANESTHESIA PROCEDURE NOTES
Peripheral IV  Date/Time: 5/5/2025 7:48 AM  Inserted by: Paty Alanis CRNA    Placement  Needle size: 20 G  Laterality: left  Location: hand  Local anesthetic: none  Site prep: alcohol  Technique: anatomical landmarks  Attempts: 1

## 2025-05-05 NOTE — ANESTHESIA PROCEDURE NOTES
Airway  Date/Time: 5/5/2025 7:45 AM  Reason: Elective    Airway not difficult    General Information and Staff   Patient location during procedure: OR  Resident/CRNA: Paty Alanis CRNA  Performed: CRNA   Performed by: Paty Alanis CRNA  Authorized by: Christos West MD        Indications and Patient Condition  Indications for airway management: anesthesia  Sedation level: deep      Preoxygenated: yesPatient position: sniffing  MILS maintained throughout    Mask difficulty assessment: 1 - vent by mask  Planned trial extubation    Final Airway Details    Final airway type: endotracheal airway    Successful airway: ETT  Cuffed: yes   Successful intubation technique: direct laryngoscopy  Endotracheal tube insertion site: oral  Blade: Delmi  Blade size: #3  ETT size (mm): 7.5    Cormack-Lehane Classification: grade I - full view of glottis  Placement verified by: capnometry   Measured from: teeth  Number of attempts at approach: 1  Ventilation between attempts: BVM  Number of other approaches attempted: 0    Additional Comments  Atraumatic, Dentition intace

## 2025-05-05 NOTE — INTERVAL H&P NOTE
Pre-op Diagnosis: Intramural uterine fibroid [D25.1]  Pelvic pain [R10.2]  Iron deficiency anemia due to chronic blood loss [D50.0]    The above referenced H&P was reviewed by Evy Heard MD on 5/5/2025, the patient was examined and no significant changes have occurred in the patient's condition since the H&P was performed.  I discussed with the patient and/or legal representative the potential benefits, risks and side effects of this procedure; the likelihood of the patient achieving goals; and potential problems that might occur during recuperation.  I discussed reasonable alternatives to the procedure, including risks, benefits and side effects related to the alternatives and risks related to not receiving this procedure.  We will proceed with procedure as planned.

## 2025-05-05 NOTE — ANESTHESIA PREPROCEDURE EVALUATION
Anesthesia PreOp Note    HPI:     Pati Harrison is a 44 year old female who presents for preoperative consultation requested by: Evy Heard MD    Date of Surgery: 5/5/2025    Procedure(s):  Total abdominal hysterectomy with bilateral salpingo-oophorectomy  Indication: Intramural uterine fibroid [D25.1]  Pelvic pain [R10.2]  Iron deficiency anemia due to chronic blood loss [D50.0]    Relevant Problems   No relevant active problems       NPO:  Last Liquid Consumption Date: 05/04/25  Last Liquid Consumption Time: 2300  Last Solid Consumption Date: 05/04/25  Last Solid Consumption Time: 2300  Last Liquid Consumption Date: 05/04/25          History Review:  Patient Active Problem List    Diagnosis Date Noted    Anemia, unspecified 06/17/2022       Past Medical History[1]    Past Surgical History[2]    Prescriptions Prior to Admission[3]  Current Medications and Prescriptions Ordered in Epic[4]    Allergies[5]    Family History[6]  Social Hx on file[7]    Available pre-op labs reviewed.  Lab Results   Component Value Date    URINEPREG Negative 05/05/2025             Vital Signs:  Body mass index is 33.01 kg/m².   height is 1.676 m (5' 6\") and weight is 92.8 kg (204 lb 8 oz). Her oral temperature is 98 °F (36.7 °C). Her blood pressure is 127/87 and her pulse is 77. Her respiration is 20 and oxygen saturation is 98%.   Vitals:    05/01/25 1025 05/05/25 0559   BP:  127/87   Pulse:  77   Resp:  20   Temp:  98 °F (36.7 °C)   TempSrc:  Oral   SpO2:  98%   Weight: 93.4 kg (206 lb) 92.8 kg (204 lb 8 oz)   Height: 1.626 m (5' 4\") 1.676 m (5' 6\")        Anesthesia Evaluation     Patient summary reviewed and Nursing notes reviewed    Airway   Mallampati: II  Dental      Pulmonary - negative ROS and normal exam   Cardiovascular - normal exam  Exercise tolerance: good    NYHA Classification: I    Neuro/Psych - negative ROS     GI/Hepatic/Renal - negative ROS     Endo/Other - negative ROS   Abdominal                   Anesthesia Plan:   ASA:  2  Plan:   General  Post-op Pain Management: IV analgesics      I have informed Pati Harrison and/or legal guardian or family member of the nature of the anesthetic plan, benefits, risks including possible dental damage if relevant, major complications, and any alternative forms of anesthetic management.   All of the patient's questions were answered to the best of my ability. The patient desires the anesthetic management as planned.  JEISON MEZA MD  5/5/2025 7:06 AM  Present on Admission:  **None**           [1]   Past Medical History:   Anemia, unspecified    Calculus of kidney    History of blood transfusion    Due to Anemia, No reaction   [2]   Past Surgical History:  Procedure Laterality Date    D & c      Hysteroscopy,with endometrial  01/24/2022   [3]   Medications Prior to Admission   Medication Sig Dispense Refill Last Dose/Taking    Vitamin D, Ergocalciferol, 09457 units Oral Cap Take 50,000 Units by mouth every 7 days.   Past Week    naproxen 500 MG Oral Tab Take 1 tablet (500 mg total) by mouth every 12 (twelve) hours as needed.   Past Week    ibuprofen 800 MG Oral Tab Take 1 tablet (800 mg total) by mouth every 6 (six) hours as needed for Pain. 30 tablet 1 Past Week   [4]   Current Facility-Administered Medications Ordered in Epic   Medication Dose Route Frequency Provider Last Rate Last Admin    lactated ringers infusion   Intravenous Continuous Evy Heard MD 20 mL/hr at 05/05/25 0641 New Bag at 05/05/25 0641    ceFAZolin (Ancef) 2g in 10mL IV syringe premix  2 g Intravenous Once Evy Heard MD         No current [facility]-ordered outpatient medications on file.   [5] No Known Allergies  [6]   Family History  Problem Relation Age of Onset    Other (cervical cancer) Mother     No Known Problems Daughter     No Known Problems Son     Kidney Disease Maternal Grandmother     Glaucoma Paternal Grandfather     No Known Problems Brother     No Known Problems  Brother     No Known Problems Brother     Breast Cancer Maternal Aunt 50   [7]   Social History  Socioeconomic History    Marital status: Single   Tobacco Use    Smoking status: Never     Passive exposure: Never    Smokeless tobacco: Never   Vaping Use    Vaping status: Never Used   Substance and Sexual Activity    Alcohol use: Yes     Alcohol/week: 1.0 standard drink of alcohol     Types: 1 Glasses of wine per week     Comment: Occassional    Drug use: Never

## 2025-05-05 NOTE — OPERATIVE REPORT
[facility] OPERATING ROOM  Operative Note     Pati Harrison Location: OR   [facility] [id number] MRN [id number]   Admission Date 5/5/2025 Operation Date 5/5/2025   Attending Physician Evy Heard MD Operating Physician Evy Heard MD      Preoperative Diagnosis: Intramural uterine fibroid [D25.1]  Pelvic pain [R10.2]  Iron deficiency anemia due to chronic blood loss [D50.0]     Postoperative Diagnosis: Intramural uterine fibroid [D25.1]Pelvic pain [R10.2]Iron deficiency anemia due to chronic blood loss [D50.0]     Procedure Performed:   Total abdominal hysterectomy with bilateral salpingo-oophorectomy     Primary Surgeon: Evy Heard MD      Assistant: Behzad Cabrera      Surgical Findings: enlarged fibroid uterus with anterior adhesions due to prior c/s, normal ovaries and tubes      Anesthesia: General with duramorph      Complications: none      Implants: * No implants in log *     Specimen: uterus, tubes, ovaries, cervix      Drains: none      Condition: stable      Estimated Blood Loss: Blood Output: 220 mL (5/5/2025  9:06 AM)       Summary of Case:   After informed consent was obtained, patient was taken to the operating room where general anesthesia was induced.  The patient had been given  IV Ancef.  She was then prepped and draped in the normal sterile fashion.  Crandall catheter was placed and was draining clear urine.  SCDs were placed on her lower extremities. Time out was done.    A Pfannenstiel skin incision was made with a scalpel and carried down to the underlying layer of anterior rectus sheath.   Anterior rectus sheath was incised using a scalpel, and it was opened superiorly  and inferiorly using the Ryder scissors.  Rectus muscles were then , and the posterior rectus sheath and parietal peritoneum were identified, tented up with hemostats, entered bluntly.  The peritoneum was then extended bluntly with good visualization of the bladder and bowel.  At this point,  an Milton retractor was placed and  the bowel was packed away using moist laparotomy sponges.  The uterus was elevated with Caterina clamps at the  cornua  for traction throughout the entire case.    The round ligament was suture ligated with 0 Vicryl and divided sharply. At this point, the ureter was identified, and the uteroovarian ligament was identified.  A peritoneal window was created, and the infundibulopelvic ligament was doubly clamped using Teto clamps, divided using the Ryder scissors, and transfixed, then double ligated using 0 Vicryl suture material with good hemostasis.   The same exact steps were then repeated on the contralateral side.    The anterior leaflet of the broad ligament was identified, defect created and extended creating a bladder flap.  The bladder was carefully dissected downward without complications.  At this point, the uterine vessels on both sides were doubly clamped, cut, and suture ligated using 0 Vicryl suture material.  The uterosacral-cardinal ligament complexes on both sides were then serially clamped, cut, and transfixed using 0 Vicryl suture material without complications.  This was continued to the level of the cervix.  At this point, the cervix was cross clamped  and the uterus was removed under the cervix with Adrien scissors and sent to pathology.   The uterus, cervix, bilateral tubes & ovaries were sent for pathological evaluation.    Vaginal cuff angle sutures were then placed using 0 Vicryl suture.  The uterosacral ligaments were secured to the vaginal cuff.  The vaginal cuff was then closed using 0 Vicryl suture in a running locked fashion.    Good hemostasis was noted.  The  pelvis was irrigated using normal saline solution and revealed good hemostasis.  Interceed was placed over the vaginal cuff. All pedicles were examined and found to be hemostatic.     The instruments, laps and retractor were removed from the abdomen.  All OR counts were correct.  The fascia was  closed using 0 looped PDS in a running fashion. The subcutaneous space was then closed using 0 plain gut suture. The skin was closed with 4-0 vicryl in a running subcuticular fashion.   Dermabond was applied to the skin.  A sterile dressing was applied.     All OR counts were correct x2.  The patient was taken to recovery room in a stable condition.    Behzad Cabrera was utilized as a surgical assist for the entire procedure due to the need for tissue retraction, dissection of vital structures, prevention and management of blood loss, and reduction in overall operative and anesthesia time. They were also involved in the critical decision making as it relates to the complexity of this case.           Evy Heard MD  5/5/2025  9:11 AM

## 2025-05-05 NOTE — ANESTHESIA PROCEDURE NOTES
Spinal Block    Date/Time: 5/5/2025 7:37 AM    Performed by: Paty Alanis CRNA  Authorized by: Christos West MD      General Information and Staff    Start Time:  5/5/2025 7:37 AM  End Time:  5/5/2025 7:40 AM  CRNA:  Paty Alanis CRNA  Performed by:  CRNA  Preanesthetic Checklist: patient identified, IV checked, risks and benefits discussed, monitors and equipment checked, pre-op evaluation, timeout performed, anesthesia consent and sterile technique used      Procedure Details    Patient Position:  Sitting  Prep: Betadine    Monitoring:  Cardiac monitor, continuous pulse ox and heart rate  Approach:  Midline  Location:  L4-5  Injection Technique:  Single-shot    Needle    Needle Type:  Pencil-tip  Needle Gauge:  24 G  Needle Length:  3.5 in    Assessment    Sensory Level:  T10  Events: clear CSF, CSF aspirated, well tolerated and blood negative      Additional Comments     Pt sitting on OR table for spinal. Monitors on/alarms audible. Cleansed lower back with Betadine from kit- dried x 3 min. 1% Lido skin wheal @ L4-L5. Easy, painless injection of hyperbaric Bupi from kit (LOT: [id number], exp: 2027-07). - heme, - parasthesia, + CSF. Everything removed from back- pt laid supine. VSS

## 2025-05-05 NOTE — ANESTHESIA POSTPROCEDURE EVALUATION
Patient: Pati Harrison    Procedure Summary       Date: 05/05/25 Room / Location: [facility] MAIN OR  / [facility] MAIN OR    Anesthesia Start: 0733 Anesthesia Stop: 0918    Procedure: Total abdominal hysterectomy with bilateral salpingo-oophorectomy (Bilateral: Abdomen) Diagnosis:       Intramural uterine fibroid      Pelvic pain      Iron deficiency anemia due to chronic blood loss      (Intramural uterine fibroid [D25.1]Pelvic pain [R10.2]Iron deficiency anemia due to chronic blood loss [D50.0])    Surgeons: Evy Heard MD Anesthesiologist: Christos West MD    Anesthesia Type: general ASA Status: 2            Anesthesia Type: general    Vitals Value Taken Time   /81 05/05/25 09:18   Temp 97.1 05/05/25 09:18   Pulse 75 05/05/25 09:17   Resp 9 05/05/25 09:17   SpO2 100 % 05/05/25 09:17   Vitals shown include unfiled device data.    [facility] AN Post Evaluation:   Patient Evaluated in PACU  Patient Participation: complete - patient participated  Level of Consciousness: awake  Pain Score: 0  Pain Management: adequate  Airway Patency:  Dental exam unchanged from preop  Yes    Nausea/Vomiting: none  Cardiovascular Status: acceptable  Respiratory Status: acceptable  Postoperative Hydration acceptable      Paty Alanis CRNA  5/5/2025 9:18 AM

## 2025-05-06 VITALS
HEIGHT: 66 IN | BODY MASS INDEX: 32.87 KG/M2 | SYSTOLIC BLOOD PRESSURE: 113 MMHG | RESPIRATION RATE: 19 BRPM | HEART RATE: 83 BPM | TEMPERATURE: 99 F | OXYGEN SATURATION: 100 % | DIASTOLIC BLOOD PRESSURE: 61 MMHG | WEIGHT: 204.5 LBS

## 2025-05-06 PROBLEM — Z01.818 PREOP TESTING: Status: ACTIVE | Noted: 2025-05-06

## 2025-05-06 LAB
BASOPHILS # BLD AUTO: 0.01 X10(3) UL (ref 0–0.2)
BASOPHILS NFR BLD AUTO: 0.1 %
DEPRECATED RDW RBC AUTO: 69 FL (ref 35.1–46.3)
EOSINOPHIL # BLD AUTO: 0.01 X10(3) UL (ref 0–0.7)
EOSINOPHIL NFR BLD AUTO: 0.1 %
ERYTHROCYTE [DISTWIDTH] IN BLOOD BY AUTOMATED COUNT: 23.4 % (ref 11–15)
HCT VFR BLD AUTO: 31.7 % (ref 35–48)
HGB BLD-MCNC: 10 G/DL (ref 12–16)
IMM GRANULOCYTES # BLD AUTO: 0.02 X10(3) UL (ref 0–1)
IMM GRANULOCYTES NFR BLD: 0.3 %
LYMPHOCYTES # BLD AUTO: 1.05 X10(3) UL (ref 1–4)
LYMPHOCYTES NFR BLD AUTO: 13.8 %
MCH RBC QN AUTO: 27.1 PG (ref 26–34)
MCHC RBC AUTO-ENTMCNC: 31.5 G/DL (ref 31–37)
MCV RBC AUTO: 85.9 FL (ref 80–100)
MONOCYTES # BLD AUTO: 0.86 X10(3) UL (ref 0.1–1)
MONOCYTES NFR BLD AUTO: 11.3 %
NEUTROPHILS # BLD AUTO: 5.68 X10 (3) UL (ref 1.5–7.7)
NEUTROPHILS # BLD AUTO: 5.68 X10(3) UL (ref 1.5–7.7)
NEUTROPHILS NFR BLD AUTO: 74.4 %
PLATELET # BLD AUTO: 219 10(3)UL (ref 150–450)
PLATELET MORPHOLOGY: NORMAL
RBC # BLD AUTO: 3.69 X10(6)UL (ref 3.8–5.3)
WBC # BLD AUTO: 7.6 X10(3) UL (ref 4–11)

## 2025-05-06 RX ORDER — OXYCODONE HYDROCHLORIDE 5 MG/1
5 TABLET ORAL EVERY 6 HOURS PRN
Qty: 20 TABLET | Refills: 0 | Status: SHIPPED | OUTPATIENT
Start: 2025-05-06 | End: 2025-05-08

## 2025-05-06 RX ORDER — GABAPENTIN 300 MG/1
300 CAPSULE ORAL 3 TIMES DAILY
Qty: 20 CAPSULE | Refills: 0 | Status: SHIPPED | OUTPATIENT
Start: 2025-05-06

## 2025-05-06 RX ORDER — DIPHENHYDRAMINE HCL 25 MG
25 CAPSULE ORAL EVERY 6 HOURS PRN
Status: DISCONTINUED | OUTPATIENT
Start: 2025-05-06 | End: 2025-05-06

## 2025-05-06 RX ORDER — ACETAMINOPHEN 500 MG
1000 TABLET ORAL EVERY 8 HOURS SCHEDULED
Qty: 30 TABLET | Refills: 0 | Status: SHIPPED | COMMUNITY
Start: 2025-05-06

## 2025-05-06 NOTE — DISCHARGE SUMMARY
GYN DISCHARGE SUMMARY      Date of Admission:  5/5/2025  Date of Discharge: 5/6/2025          Admission Diagnoses:  Intramural uterine fibroid [D25.1]  Pelvic pain [R10.2]  Iron deficiency anemia due to chronic blood loss [D50.0]  Discharge Diagnoses:  Active Problems:    Intramural uterine fibroid    Menorrhagia with irregular cycle        Admit Date:   5/5/2025   Discharge Date:       Condition at Discharge:  Stable      Operations/Procedures this visit:  KAVITHA/BSO      Hospital Course:    The patient was admitted & underwent a KAVITHA/BSO for fibroids and pelvic pain .  Postoperatively the patient did very well.  Her hemoglobin stabilized.  The patient was feeling well, tolerating a regular diet, & ambulating without difficulty.  Her vitals had remained stable & she was afebrile.  Her incision was clean, dry, & intact without evidence of infection or erythema.  The patient desired discharge on POD # 1.       Condition at Discharge:  /64 (BP Location: Left arm)   Pulse 67   Temp 97 °F (36.1 °C) (Temporal)   Resp 18   Ht 5' 6\" (1.676 m)   Wt 204 lb 8 oz (92.8 kg)   LMP 04/11/2025 (Approximate)   SpO2 98%   BMI 33.01 kg/m²     Physical Exam   Gen - NAD  Abdomen -soft, ND, NT  Fundus - firm, NT  Incision - C/D/I  Extremeties - NT    Discharge Disposition:  Home or Self Care     Discharge Medications:     Discharge Medications        START taking these medications        Instructions Prescription details   acetaminophen 500 MG Tabs  Commonly known as: Tylenol Extra Strength      Take 2 tablets (1,000 mg total) by mouth every 8 (eight) hours.   Quantity: 30 tablet  Refills: 0     gabapentin 300 MG Caps  Commonly known as: Neurontin      Take 1 capsule (300 mg total) by mouth 3 (three) times daily.   Quantity: 20 capsule  Refills: 0     oxyCODONE 5 MG Tabs      Take 1 tablet (5 mg total) by mouth every 6 (six) hours as needed for Pain.   Quantity: 20 tablet  Refills: 0            CONTINUE taking these  medications        Instructions Prescription details   ibuprofen 800 MG Tabs  Commonly known as: Motrin      Take 1 tablet (800 mg total) by mouth every 6 (six) hours as needed for Pain.   Quantity: 30 tablet  Refills: 1     naproxen 500 MG Tabs  Commonly known as: Naprosyn      Take 1 tablet (500 mg total) by mouth every 12 (twelve) hours as needed.   Refills: 0     Vitamin D (Ergocalciferol) 05387 units Caps      Take 50,000 Units by mouth every 7 days.   Refills: 0               Where to Get Your Medications        These medications were sent to [id number] - [place], [place] - [address] [phone], [phone] [address], [facility] [address]      Phone: [phone]   gabapentin 300 MG Caps  oxyCODONE 5 MG Tabs       Information about where to get these medications is not yet available    Ask your nurse or doctor about these medications  acetaminophen 500 MG Tabs          Plan of Care:   Diet: Regular As Tolerated  Activity:  Pelvic rest, No heavy lifting, No driving    Follow Up:  Dr. Heard  in 2-3 weeks  Call office if heavy bleeding soaking a pad in <1hr, fever >100.4, foul smelling drainage or bleeding from incision.       Evy Heard MD

## 2025-05-06 NOTE — DISCHARGE INSTRUCTIONS
Diet: Regular As Tolerated  Activity:  Pelvic rest, No heavy lifting, No driving    Follow Up:  Dr. Heard  in 2-3 weeks  Call office if heavy bleeding soaking a pad in <1hr, fever >100.4, foul smelling drainage or bleeding from incision.

## 2025-05-08 ENCOUNTER — TELEPHONE (OUTPATIENT)
Dept: OBGYN CLINIC | Facility: CLINIC | Age: 45
End: 2025-05-08

## 2025-05-08 DIAGNOSIS — Z90.710 S/P HYSTERECTOMY: ICD-10-CM

## 2025-05-08 RX ORDER — OXYCODONE HYDROCHLORIDE 5 MG/1
5 TABLET ORAL EVERY 6 HOURS PRN
Qty: 10 TABLET | Refills: 0 | Status: SHIPPED | OUTPATIENT
Start: 2025-05-08

## 2025-05-08 NOTE — TELEPHONE ENCOUNTER
Patient called requesting status of forms. Located forms via [employer] on 5/6/25. Sent [employer] message for Release of Information. Logged for processing. Details are below.    Type of Leave: Continuous  Reason for Leave: Total abdominal hysterectomy sx 5/5/25  Start date of leave: 5/5/25 to 8/5/25, Return to work 8/6/25  End date of leave:8/5//25  How many flare ups per month/length?:  How many appts per month/length?:   Was Fee and Turnaround info Given?:

## 2025-05-14 ENCOUNTER — OFFICE VISIT (OUTPATIENT)
Dept: OBGYN CLINIC | Facility: CLINIC | Age: 45
End: 2025-05-14

## 2025-05-14 VITALS
WEIGHT: 202 LBS | SYSTOLIC BLOOD PRESSURE: 119 MMHG | HEIGHT: 66 IN | DIASTOLIC BLOOD PRESSURE: 84 MMHG | BODY MASS INDEX: 32.47 KG/M2 | HEART RATE: 99 BPM

## 2025-05-14 DIAGNOSIS — R23.2 HOT FLASHES: ICD-10-CM

## 2025-05-14 DIAGNOSIS — Z09 POSTOP CHECK: Primary | ICD-10-CM

## 2025-05-14 PROCEDURE — 99024 POSTOP FOLLOW-UP VISIT: CPT | Performed by: OBSTETRICS & GYNECOLOGY

## 2025-05-14 RX ORDER — ESTRADIOL 0.1 MG/G
CREAM VAGINAL
Qty: 42.5 G | Refills: 3 | Status: SHIPPED | OUTPATIENT
Start: 2025-05-14

## 2025-05-14 RX ORDER — ESTRADIOL 0.07 MG/D
1 FILM, EXTENDED RELEASE TRANSDERMAL
Qty: 24 PATCH | Refills: 4 | Status: SHIPPED | OUTPATIENT
Start: 2025-05-15

## 2025-05-14 NOTE — TELEPHONE ENCOUNTER
Dr. Heard                                            2 FORMS    Please sign off on form if you agree to: disability 1/2, for surgery    -Signature page will be the first page scanned  -From your Inbasket, Highlight the patient and click Chart   -Double click the 5/8/25 Forms Completion telephone encounter  -Scroll down to the Media section   -Click the blue Hyperlink: disability1 Dr. Heard 5/14/25 AND disability 2 Dr. Heard 5/14/25  -Click Acknowledge located in the top right ribbon/menu   -Drag the mouse into the blank space of the document and a + sign will appear. Left click to   electronically sign the document.  -Once signed, simply exit out of the screen and you signature will be saved.     Thank you,  Kerri

## 2025-05-14 NOTE — PROGRESS NOTES
Post-op follow up        Visit Type: Post-operative follow-up    Date of Procedure:  05/05/2025    Procedure:  Total abdominal hysterectomy with bilateral salpingo-oophorectomy    Indications for Procedure:  Intramural uterine fibroid,Pelvic pain,Iron deficiency anemia due to chronic blood loss    Pathology Report: Final Diagnosis:    Uterus, cervix, bilateral fallopian tubes and ovaries, total abdominal hysterectomy with bilateral salpingo-oophorectomy:   · Cervix with nabothian cysts and small focus of endometriosis.  · Proliferative endometrium.  · Adenomyosis.  · Leiomyomata uteri (0.3-4.2 cm).  · Left fallopian tube with vascular congestion.  · Right fallopian tube with paratubal cyst.   · Right and left ovaries with no significant histopathological change.  · No definitive evidence of malignancy identified.    Surgery/Post-op Complications: pt is having some concerns of sleeping. Pt is unable to sleep     Pain:  3 out of 10     Medications pertaining to Surgery:  oxycodone, ibuprofen, gabapentin, acetaminophen     Incision (if applicable):  na      Diet: normal     Voiding:  yes     Bowel movements/Flatus:  yes     Activity: no due to the level of pain     Problems: Pt states having a shortness of breath, ongoing headaches, unable to sleep and still in pain.    /84 (BP Location: Right arm, Patient Position: Sitting, Cuff Size: adult)   Pulse 99   Ht 5' 6\" (1.676 m)   Wt 202 lb (91.6 kg)   LMP 04/11/2025 (Approximate)   BMI 32.60 kg/m²   Wt Readings from Last 3 Encounters:   05/14/25 202 lb (91.6 kg)   05/05/25 204 lb 8 oz (92.8 kg)   03/04/25 210 lb 3.3 oz (95.4 kg)     Health Maintenance   Topic Date Due    Annual Physical  Never done    Colorectal Cancer Screening  Never done    Mammogram  Never done    Pap Smear  Never done    COVID-19 Vaccine (2 - 2024-25 season) 09/01/2024    Influenza Vaccine (Season Ended) 10/01/2025    DTaP,Tdap,and Td Vaccines (2 - Td or Tdap) 12/09/2030    Annual  Depression Screening  Completed    Pneumococcal Vaccine: Birth to 50yrs  Aged Out    Meningococcal B Vaccine  Aged Out       Review of Systems   General: Present- Feeling well. Not Present- Chills, Fever, Weight Gain and Weight Loss.  HEENT: Not Present- Headache and Sore Throat.  Respiratory: Not Present- Cough, Difficulty Breathing, Hemoptysis and Sputum Production.  Breast: Not Present- Breast Mass, Breast Pain and Nipple Discharge.  Cardiovascular: Not Present- Chest Pain, Elevated Blood Pressure, Fainting / Blacking Out and Shortness of Breath.  Gastrointestinal: Not Present- Bloody Stool, Constipation, Diarrhea, Heartburn, Nausea and Vomiting.  Female Genitourinary: Not Present- Discharge, Dysuria, Frequency, Incontinence, Pelvic Pain and Urgency.  Musculoskeletal: Not Present- Leg Cramps and Swelling of Extremities.  Neurological: Not Present- Dizziness and Headaches.  Psychiatric: Not Present- Anxiety and Depression.  Endocrine: Not Present- Appetite Changes, Hair Changes and Thyroid Problems.  Hematology: Not Present- Blood Clots, Easy Bruising and Excessive bleeding.  All other systems negative     Physical Exam   The physical exam findings are as follows:     General   Mental Status - Alert. General Appearance - Cooperative. Orientation - Oriented X4. Build & Nutrition - Well nourished.    Abdomen   Inspection: Inspection of the abdomen reveals - No Hernias. Incisional scars - KAVITHA  incisional scars Healing well   Palpation/Percussion: Palpation and Percussion of the abdomen reveal - Non Tender and No Palpable abdominal masses.  Liver: - Normal.    Female Genitourinary     External Genitalia   Perineum - Normal. Bartholin's Gland - Bilateral - Normal. Clitoris - Normal.  Introitus: Characteristics - No Cystocele, Enterocele or Rectocele. Discharge - None.  Labia Majora: Lesions - Bilateral - None. Characteristics - Bilateral - Normal.  Urethra: Characteristics - Normal. Discharge - None.  [place] Gland -  Bilateral - Normal.  Vulva: Lesions - None.    Speculum & Bimanual   Vagina:   Vaginal Wall: - Normal.  Vaginal Lesions - None. Vaginal Mucosa - Normal.  Cervix: Characteristics - Surgically absent.  Uterus: Characteristics - Surgically absent.  Adnexa: Characteristics - Bilateral - Normal. Masses - No Adnexal Masses.  Wet Mount: Main patient complaints - None.          Rectal   Anorectal Exam: External - normal external exam.    Peripheral Vascular   Upper Extremity: Inspection - Bilateral - Normal - No Clubbing, No Cyanosis, No Edema, Pulses Intact.  Palpation: - Pulses bilaterally normal.  Lower Extremity: Inspection - Bilateral - Inspection Normal.  Palpation: Edema - Bilateral - No edema.    Neurologic   Mental Status: - Normal.     Lymphatic  General Lymphatics   Description - Normal .    Reviewed options for treatment for menopause including diet, exercise, weight loss, stress reduction, environmental and medication.  Patient to try estrogen with progesterone quarterly and return to the office in 3 months to review effectiveness of plan. Patient was provided with informed consent for medication including a review of the proposed medication and all effective possibilities.  A discussion of the risks of the medication, benefits, side effects, and success were addressed.  Alternative treatments were discussed as well.     Oral estrogens should be avoided in women with hypertriglyceridemia, active gallbladder disease, or known thrombophilias such as factor V Leiden (with or without a personal history of venous thromboembolism [VTE]). Transdermal estrogen is also preferred for women with migraine headaches with auras. However, the baseline risk of both VTE and stroke is very low in otherwise healthy, young, postmenopausal women.      All questions were answered.  Patient is to proceed with Medication.      1. Postop check    2. Hot flashes

## 2025-05-16 ENCOUNTER — TELEPHONE (OUTPATIENT)
Dept: OBGYN CLINIC | Facility: CLINIC | Age: 45
End: 2025-05-16

## 2025-05-16 NOTE — TELEPHONE ENCOUNTER
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  She needs that indicated on form.  Informed patient it was not indicated as a restrictions because it was not mentioned by provider.  Patient will call and discuss this restriction with provider and request letter.

## 2025-05-16 NOTE — TELEPHONE ENCOUNTER
Patient calling to follow up on forms, advised patient forms have been completed; patient requested forms to be uploaded to [employer] as she wants to review them. Forms sent via [employer] message per patient's request.

## 2025-05-19 NOTE — TELEPHONE ENCOUNTER
Patient called today, adv that [employer] forms page 2 question asked is date of 1st symptoms should read 3/2025 NOT 2022 due to current ailment is reason for leave, this is not a preexisting conditions    Patient adv we are still pending letter from provider to make additional changes under revision

## 2025-05-20 ENCOUNTER — TELEPHONE (OUTPATIENT)
Dept: OBGYN CLINIC | Facility: CLINIC | Age: 45
End: 2025-05-20

## 2025-05-21 NOTE — TELEPHONE ENCOUNTER
Patient called, requests that both [facility], [phone] and [employer], [phone] forms be faxed.  Completing request.

## 2025-05-21 NOTE — TELEPHONE ENCOUNTER
Patient called to get status on revision for [employer] forms from message below. Revision not completed, Forms placed in my box for revision.

## 2025-05-23 NOTE — TELEPHONE ENCOUNTER
Patient called in to confirm if forms were faxed out. Informed patient forms were  faxed out on 5/21/25. Patient verbalized understanding.

## 2025-05-27 ENCOUNTER — TELEPHONE (OUTPATIENT)
Dept: OBGYN CLINIC | Facility: CLINIC | Age: 45
End: 2025-05-27

## 2025-05-27 NOTE — TELEPHONE ENCOUNTER
Patient's job is requiring her to have a post op follow up before the end of June. No current openings. Please call to discuss.

## 2025-06-24 ENCOUNTER — OFFICE VISIT (OUTPATIENT)
Dept: OBGYN CLINIC | Facility: CLINIC | Age: 45
End: 2025-06-24

## 2025-06-24 VITALS
SYSTOLIC BLOOD PRESSURE: 140 MMHG | WEIGHT: 200 LBS | DIASTOLIC BLOOD PRESSURE: 80 MMHG | BODY MASS INDEX: 32.14 KG/M2 | HEIGHT: 66 IN

## 2025-06-24 DIAGNOSIS — N60.02 BENIGN CYST OF LEFT BREAST: ICD-10-CM

## 2025-06-24 DIAGNOSIS — Z09 POSTOP CHECK: Primary | ICD-10-CM

## 2025-06-24 DIAGNOSIS — R23.2 HOT FLASHES: ICD-10-CM

## 2025-06-24 DIAGNOSIS — Z90.710 S/P HYSTERECTOMY: ICD-10-CM

## 2025-06-24 PROCEDURE — 99024 POSTOP FOLLOW-UP VISIT: CPT | Performed by: OBSTETRICS & GYNECOLOGY

## 2025-06-24 NOTE — PROGRESS NOTES
Chief Complaint   Patient presents with    Follow - Up         Pati Harrison is a 44 year old female who presents for 6 week post op follow up.    LMP: KAVITHA/BSO.      Pathology Report: Final Diagnosis:    Uterus, cervix, bilateral fallopian tubes and ovaries, total abdominal hysterectomy with bilateral salpingo-oophorectomy:   · Cervix with nabothian cysts and small focus of endometriosis.  · Proliferative endometrium.  · Adenomyosis.  · Leiomyomata uteri (0.3-4.2 cm).  · Left fallopian tube with vascular congestion.  · Right fallopian tube with paratubal cyst.   · Right and left ovaries with no significant histopathological change.  · No definitive evidence of malignancy identified.    Menses regular: n/a.    Menstrual flow normal: n/a.    Birth control or HRT: hysterectomy.   Refill 0  Last Pap Smear: n/a . Any history of abnormal paps: n/a   Gardasil:(age 9-44 y/o) n/a.   Any medication refills needed today?: no    Problems/concerns: Patient presents for a 6 week follow up to return to work.      Next Appt: n/a        Immunization History   Administered Date(s) Administered    Covid-19 Vaccine NuConomy (J&J) 0.5ml 2021    TDAP 2020       Medications - Current[1]    Allergies[2]    OB History    Para Term  AB Living   4 2 2 0 2 2   SAB IAB Ectopic Multiple Live Births   0 2 0 0 2      # Outcome Date GA Lbr Valeriy/2nd Weight Sex Type Anes PTL Lv   4 Term  40w0d  6 lb (2.722 kg) M Vag-Spont  N VALENCIA   3 Term  40w0d  6 lb 3 oz (2.807 kg) F CS-Unspec   VALENCIA   2 IAB            1 IAB                Gyn History      No data recorded       No data to display                    Past Medical History[3]    Past Surgical History[4]    Family History[5]     Allergies         Social History     Socioeconomic History    Marital status: Single     Spouse name: Not on file    Number of children: Not on file    Years of education: Not on file    Highest education level: Not on file   Occupational  History    Not on file   Tobacco Use    Smoking status: Never     Passive exposure: Never    Smokeless tobacco: Never   Vaping Use    Vaping status: Never Used   Substance and Sexual Activity    Alcohol use: Yes     Alcohol/week: 1.0 standard drink of alcohol     Types: 1 Glasses of wine per week     Comment: Occassional    Drug use: Never    Sexual activity: Not on file   Other Topics Concern    Not on file   Social History Narrative    Not on file     Social Drivers of Health     Food Insecurity: No Food Insecurity (5/5/2025)    NCSS - Food Insecurity     Worried About Running Out of Food in the Last Year: No     Ran Out of Food in the Last Year: No   Transportation Needs: No Transportation Needs (5/5/2025)    NCSS - Transportation     Lack of Transportation: No   Stress: Not on file   Housing Stability: Not At Risk (5/5/2025)    NCSS - Housing/Utilities     Has Housing: Yes     Worried About Losing Housing: No     Unable to Get Utilities: No     /80   Ht 5' 6\" (1.676 m)   Wt 200 lb (90.7 kg)   LMP 04/11/2025 (Approximate)   BMI 32.28 kg/m²   Wt Readings from Last 3 Encounters:   06/24/25 200 lb (90.7 kg)   05/14/25 202 lb (91.6 kg)   05/05/25 204 lb 8 oz (92.8 kg)     Health Maintenance   Topic Date Due    Annual Physical  Never done    Colorectal Cancer Screening  Never done    Mammogram  Never done    Pap Smear  Never done    COVID-19 Vaccine (2 - 2024-25 season) 09/01/2024    Influenza Vaccine (Season Ended) 10/01/2025    DTaP,Tdap,and Td Vaccines (2 - Td or Tdap) 12/09/2030    Annual Depression Screening  Completed    Pneumococcal Vaccine: Birth to 50yrs  Aged Out    Meningococcal B Vaccine  Aged Out       Review of Systems   General: Present- Feeling well. Not Present- Chills, Fever, Weight Gain and Weight Loss.  HEENT: Not Present- Headache and Sore Throat.  Respiratory: Not Present- Cough, Difficulty Breathing, Hemoptysis and Sputum Production.  Breast: Not Present- Breast Mass, Breast Pain and  Nipple Discharge.  Cardiovascular: Not Present- Chest Pain, Elevated Blood Pressure, Fainting / Blacking Out and Shortness of Breath.  Gastrointestinal: Not Present- Bloody Stool, Constipation, Diarrhea, Heartburn, Nausea and Vomiting.  Female Genitourinary: Not Present- Discharge, Dysuria, Frequency, Incontinence, Pelvic Pain and Urgency.  Musculoskeletal: Not Present- Leg Cramps and Swelling of Extremities.  Neurological: Not Present- Dizziness and Headaches.  Psychiatric: Not Present- Anxiety and Depression.  Endocrine: Not Present- Appetite Changes, Hair Changes and Thyroid Problems.  Hematology: Not Present- Blood Clots, Easy Bruising and Excessive bleeding.  All other systems negative     Physical Exam   The physical exam findings are as follows:     General   Mental Status - Alert. General Appearance - Cooperative. Orientation - Oriented X4. Build & Nutrition - Well nourished.    Head and Neck  Thyroid   Gland Characteristics - normal size and consistency.    Chest and Lung Exam   Inspection:   Chest Wall: - Normal.  Palpation: - Palpation normal.  Auscultation:   Breath sounds: - Normal.  Adventitious sounds: - No Adventitious sounds.      Breast   Nipples: Characteristics - Bilateral - Normal. Discharge - Bilateral - None.  Breast - Bilateral - Symmetric. Left breast cyst 1:00           Cardiovascular   Auscultation: Rhythm - Regular. Heart Sounds - Normal heart sounds.  Murmurs & Other Heart Sounds: Auscultation of the heart reveals - No Murmurs.    Abdomen   Inspection: Inspection of the abdomen reveals - No Hernias. Incisional scars - KAVITHA incisional scar healing well .  Palpation/Percussion: Palpation and Percussion of the abdomen reveal - Non Tender and No Palpable abdominal masses.  Liver: - Normal.    Female Genitourinary     External Genitalia   Perineum - Normal. Bartholin's Gland - Bilateral - Normal. Clitoris - Normal.  Introitus: Characteristics - No Cystocele, Enterocele or Rectocele. Discharge  - None.  Labia Majora: Lesions - Bilateral - None. Characteristics - Bilateral - Normal.  Urethra: Characteristics - Normal. Discharge - None.  Vilas Gland - Bilateral - Normal.  Vulva: Lesions - None.    Speculum & Bimanual   Vagina:   Vaginal Wall: - Normal.  Vaginal Lesions - None. Vaginal Mucosa - Normal.  Cervix: Characteristics - Surgically absent.  Uterus: Characteristics - Surgically absent.  Adnexa: Characteristics - Bilateral - Normal. Masses - No Adnexal Masses.  Wet Mount: Main patient complaints - None.          Rectal   Anorectal Exam: External - normal external exam.    Peripheral Vascular   Upper Extremity: Inspection - Bilateral - Normal - No Clubbing, No Cyanosis, No Edema, Pulses Intact.  Palpation: - Pulses bilaterally normal.  Lower Extremity: Inspection - Bilateral - Inspection Normal.  Palpation: Edema - Bilateral - No edema.    Neurologic   Mental Status: - Normal.     Lymphatic  General Lymphatics   Description - Normal .    1. Postop check    2. Hot flashes    3. S/P hysterectomy    4. Benign cyst of left breast               [1]   Current Outpatient Medications:     estradiol (MINIVELLE) 0.075 MG/24HR Transdermal Patch Biweekly, Place 1 patch onto the skin twice a week., Disp: 24 patch, Rfl: 4    estradiol (ESTRACE) 0.1 MG/GM Vaginal Cream, Place 1 g vaginally every evening for 7 days, THEN 1 g twice a week., Disp: 42.5 g, Rfl: 3    oxyCODONE 5 MG Oral Tab, Take 1 tablet (5 mg total) by mouth every 6 (six) hours as needed for Pain., Disp: 10 tablet, Rfl: 0    acetaminophen 500 MG Oral Tab, Take 2 tablets (1,000 mg total) by mouth every 8 (eight) hours., Disp: 30 tablet, Rfl: 0    gabapentin 300 MG Oral Cap, Take 1 capsule (300 mg total) by mouth 3 (three) times daily., Disp: 20 capsule, Rfl: 0    Vitamin D, Ergocalciferol, 63563 units Oral Cap, Take 50,000 Units by mouth every 7 days., Disp: , Rfl:     naproxen 500 MG Oral Tab, Take 1 tablet (500 mg total) by mouth every 12 (twelve) hours  as needed., Disp: , Rfl:     ibuprofen 800 MG Oral Tab, Take 1 tablet (800 mg total) by mouth every 6 (six) hours as needed for Pain., Disp: 30 tablet, Rfl: 1  [2] No Known Allergies  [3]   Past Medical History:   Anemia, unspecified    Calculus of kidney    History of blood transfusion    Due to Anemia, No reaction   [4]   Past Surgical History:  Procedure Laterality Date    D & c      Hysteroscopy,with endometrial  01/24/2022   [5]   Family History  Problem Relation Age of Onset    Other (cervical cancer) Mother     No Known Problems Daughter     No Known Problems Son     Kidney Disease Maternal Grandmother     Glaucoma Paternal Grandfather     No Known Problems Brother     No Known Problems Brother     No Known Problems Brother     Breast Cancer Maternal Aunt 50

## (undated) DEVICE — GLOVE SUR 6.5 SENSICARE PIP WHT PWD F

## (undated) DEVICE — PAD,ABDOMINAL,8"X7.5",STERILE,LF,1/PK: Brand: MEDLINE

## (undated) DEVICE — LAPAROTOMY: Brand: MEDLINE INDUSTRIES, INC.

## (undated) DEVICE — DRAPE,T,LAPARO,TRANS,STERILE: Brand: MEDLINE

## (undated) DEVICE — SUT PLN GUT 2-0 27IN CT ABSRB TAN YELLOWISH 4

## (undated) DEVICE — SPONGE LAP 18X18IN WHT COT 4 PLY FLD STRUNG

## (undated) DEVICE — SUT VCRL 4-0 27IN KS ABSRB UD L65MM REV CUT

## (undated) DEVICE — 3M(TM) MEDIPORE(TM) H SOFT CLOTH TAPE 2866: Brand: 3M™ MEDIPORE™

## (undated) DEVICE — ADHESIVE SKIN TOP FOR WND CLSR DERMBND ADV

## (undated) DEVICE — SUT VCRL 0 L18IN ABSRB VLT POLYGLACTIN 910

## (undated) DEVICE — SOLUTION IRRIG 1000ML ST H2O AQUALITE PLAS

## (undated) DEVICE — SUT PDS II 0 60IN TP-1 ABSRB VLT L65MM 1/2

## (undated) DEVICE — SOLUTION IRRIG 1000ML 0.9% NACL USP BTL

## (undated) DEVICE — WOUND RETRACTOR AND PROTECTOR: Brand: ALEXIS O WOUND PROTECTOR-RETRACTOR

## (undated) DEVICE — VIOLET BRAIDED (POLYGLACTIN 910), SYNTHETIC ABSORBABLE SUTURE: Brand: COATED VICRYL

## (undated) DEVICE — ANTIBACTERIAL VIOLET BRAIDED (POLYGLACTIN 910), SYNTHETIC ABSORBABLE SUTURE: Brand: COATED VICRYL

## (undated) NOTE — LETTER
6/24/2025            To Whom It May Concern:    Pati Harrison was seen in our office today for her second postoperative follow-up following a total abdominal hysterectomy. She reports continued improvement in her recovery with no new complaints or complications. On examination, her surgical site is healing well, and there are no signs of infection or other concerns. Given her stable condition and satisfactory progress, she has been medically cleared to resume work duties without any restrictions. She was advised to monitor for any concerning symptoms and to follow up as needed.        Sincerely,    Evy Heard MD  [facility], [address] [place] [id number]  [phone]

## (undated) NOTE — LETTER
6/24/2025            To Whom It May Concern:    Pati Harrison, The patient was seen in our office today for her second postoperative follow-up following a total abdominal hysterectomy. She reports continued improvement in her recovery with no new complaints or complications. On examination, her surgical site is healing well, and there are no signs of infection or other concerns. Given her stable condition and satisfactory progress, she has been medically cleared to resume work duties on August 5, 2025 without any restrictions. She was advised to monitor for any concerning symptoms and to follow up as needed.        Sincerely,    Evy Heard MD  [facility], [address] [place] [id number]  [phone]